# Patient Record
Sex: FEMALE | Race: WHITE | NOT HISPANIC OR LATINO
[De-identification: names, ages, dates, MRNs, and addresses within clinical notes are randomized per-mention and may not be internally consistent; named-entity substitution may affect disease eponyms.]

---

## 2017-08-03 ENCOUNTER — TRANSCRIPTION ENCOUNTER (OUTPATIENT)
Age: 67
End: 2017-08-03

## 2017-12-15 ENCOUNTER — APPOINTMENT (OUTPATIENT)
Dept: MAMMOGRAPHY | Facility: CLINIC | Age: 67
End: 2017-12-15
Payer: COMMERCIAL

## 2017-12-15 ENCOUNTER — OUTPATIENT (OUTPATIENT)
Dept: OUTPATIENT SERVICES | Facility: HOSPITAL | Age: 67
LOS: 1 days | End: 2017-12-15
Payer: COMMERCIAL

## 2017-12-15 DIAGNOSIS — Z00.8 ENCOUNTER FOR OTHER GENERAL EXAMINATION: ICD-10-CM

## 2017-12-15 PROCEDURE — 77067 SCR MAMMO BI INCL CAD: CPT

## 2017-12-15 PROCEDURE — 77063 BREAST TOMOSYNTHESIS BI: CPT | Mod: 26

## 2017-12-15 PROCEDURE — G0202: CPT | Mod: 26

## 2017-12-15 PROCEDURE — 77063 BREAST TOMOSYNTHESIS BI: CPT

## 2018-04-14 ENCOUNTER — OUTPATIENT (OUTPATIENT)
Dept: OUTPATIENT SERVICES | Facility: HOSPITAL | Age: 68
LOS: 1 days | End: 2018-04-14
Payer: COMMERCIAL

## 2018-04-14 ENCOUNTER — APPOINTMENT (OUTPATIENT)
Dept: ULTRASOUND IMAGING | Facility: CLINIC | Age: 68
End: 2018-04-14
Payer: COMMERCIAL

## 2018-04-14 DIAGNOSIS — Z00.8 ENCOUNTER FOR OTHER GENERAL EXAMINATION: ICD-10-CM

## 2018-04-14 PROCEDURE — 76536 US EXAM OF HEAD AND NECK: CPT

## 2018-04-14 PROCEDURE — 76536 US EXAM OF HEAD AND NECK: CPT | Mod: 26

## 2019-01-22 ENCOUNTER — APPOINTMENT (OUTPATIENT)
Dept: MAMMOGRAPHY | Facility: CLINIC | Age: 69
End: 2019-01-22
Payer: COMMERCIAL

## 2019-01-22 ENCOUNTER — OUTPATIENT (OUTPATIENT)
Dept: OUTPATIENT SERVICES | Facility: HOSPITAL | Age: 69
LOS: 1 days | End: 2019-01-22
Payer: COMMERCIAL

## 2019-01-22 ENCOUNTER — APPOINTMENT (OUTPATIENT)
Dept: RADIOLOGY | Facility: CLINIC | Age: 69
End: 2019-01-22
Payer: COMMERCIAL

## 2019-01-22 DIAGNOSIS — Z00.8 ENCOUNTER FOR OTHER GENERAL EXAMINATION: ICD-10-CM

## 2019-01-22 PROCEDURE — 77067 SCR MAMMO BI INCL CAD: CPT | Mod: 26

## 2019-01-22 PROCEDURE — 77067 SCR MAMMO BI INCL CAD: CPT

## 2019-01-22 PROCEDURE — 77063 BREAST TOMOSYNTHESIS BI: CPT

## 2019-01-22 PROCEDURE — 77063 BREAST TOMOSYNTHESIS BI: CPT | Mod: 26

## 2019-01-22 PROCEDURE — 77080 DXA BONE DENSITY AXIAL: CPT

## 2019-01-22 PROCEDURE — 77080 DXA BONE DENSITY AXIAL: CPT | Mod: 26

## 2019-05-16 ENCOUNTER — TRANSCRIPTION ENCOUNTER (OUTPATIENT)
Age: 69
End: 2019-05-16

## 2019-05-31 ENCOUNTER — TRANSCRIPTION ENCOUNTER (OUTPATIENT)
Age: 69
End: 2019-05-31

## 2019-10-29 ENCOUNTER — APPOINTMENT (OUTPATIENT)
Dept: ULTRASOUND IMAGING | Facility: CLINIC | Age: 69
End: 2019-10-29
Payer: COMMERCIAL

## 2019-10-29 ENCOUNTER — OUTPATIENT (OUTPATIENT)
Dept: OUTPATIENT SERVICES | Facility: HOSPITAL | Age: 69
LOS: 1 days | End: 2019-10-29
Payer: COMMERCIAL

## 2019-10-29 DIAGNOSIS — E04.1 NONTOXIC SINGLE THYROID NODULE: ICD-10-CM

## 2019-10-29 PROCEDURE — 76536 US EXAM OF HEAD AND NECK: CPT

## 2019-10-29 PROCEDURE — 76536 US EXAM OF HEAD AND NECK: CPT | Mod: 26

## 2020-02-21 ENCOUNTER — APPOINTMENT (OUTPATIENT)
Dept: MAMMOGRAPHY | Facility: CLINIC | Age: 70
End: 2020-02-21
Payer: COMMERCIAL

## 2020-02-21 ENCOUNTER — OUTPATIENT (OUTPATIENT)
Dept: OUTPATIENT SERVICES | Facility: HOSPITAL | Age: 70
LOS: 1 days | End: 2020-02-21
Payer: COMMERCIAL

## 2020-02-21 DIAGNOSIS — Z00.8 ENCOUNTER FOR OTHER GENERAL EXAMINATION: ICD-10-CM

## 2020-02-21 PROCEDURE — 77063 BREAST TOMOSYNTHESIS BI: CPT

## 2020-02-21 PROCEDURE — 77063 BREAST TOMOSYNTHESIS BI: CPT | Mod: 26

## 2020-02-21 PROCEDURE — 77067 SCR MAMMO BI INCL CAD: CPT | Mod: 26

## 2020-02-21 PROCEDURE — 77067 SCR MAMMO BI INCL CAD: CPT

## 2020-12-30 ENCOUNTER — TRANSCRIPTION ENCOUNTER (OUTPATIENT)
Age: 70
End: 2020-12-30

## 2021-02-24 ENCOUNTER — APPOINTMENT (OUTPATIENT)
Dept: COLORECTAL SURGERY | Facility: CLINIC | Age: 71
End: 2021-02-24
Payer: COMMERCIAL

## 2021-02-24 VITALS
HEIGHT: 64 IN | WEIGHT: 158 LBS | SYSTOLIC BLOOD PRESSURE: 150 MMHG | DIASTOLIC BLOOD PRESSURE: 80 MMHG | BODY MASS INDEX: 26.98 KG/M2 | RESPIRATION RATE: 12 BRPM | HEART RATE: 62 BPM

## 2021-02-24 DIAGNOSIS — K21.9 GASTRO-ESOPHAGEAL REFLUX DISEASE W/OUT ESOPHAGITIS: ICD-10-CM

## 2021-02-24 DIAGNOSIS — Z82.49 FAMILY HISTORY OF ISCHEMIC HEART DISEASE AND OTHER DISEASES OF THE CIRCULATORY SYSTEM: ICD-10-CM

## 2021-02-24 DIAGNOSIS — Z87.19 PERSONAL HISTORY OF OTHER DISEASES OF THE DIGESTIVE SYSTEM: ICD-10-CM

## 2021-02-24 PROCEDURE — 45300 PROCTOSIGMOIDOSCOPY DX: CPT

## 2021-02-24 PROCEDURE — 99203 OFFICE O/P NEW LOW 30 MIN: CPT

## 2021-02-24 PROCEDURE — 99072 ADDL SUPL MATRL&STAF TM PHE: CPT

## 2021-02-24 PROCEDURE — 99214 OFFICE O/P EST MOD 30 MIN: CPT | Mod: 25

## 2021-02-24 NOTE — REVIEW OF SYSTEMS
[As Noted in HPI] : as noted in HPI [Negative] : Endocrine [Chest Pain] : no chest pain [Shortness Of Breath] : no shortness of breath [Joint Pain] : no joint pain [Easy Bleeding] : no tendency for easy bleeding [Easy Bruising] : no tendency for easy bruising

## 2021-02-24 NOTE — ASSESSMENT
[FreeTextEntry1] : Pleasant 70-year-old female who presents today for consultation regarding a recent incomplete colonoscopy.\par \par In 2016 the patient went for a screening colonoscopy which could not be completed due to tortuosity and anatomic variation. A virtual colonoscopy was negative. Recently another attempt was made at a colonoscopy which again could not be completed due to anatomic constraints. Scope apparently could not be passed proximal to 40 cm. The area of abnormality was in the rectosigmoid. Supposedly a rectal polyp was seen. Patient was sent for another virtual colonoscopy which showed the possibility of a 10 x 6 mm distal transverse colon polyp. Patient has no problems moving her bowels and denies rectal bleeding.\par \par She has had no prior abdominal surgery. She does have hypercholesterolemia. She has had surgery on her right hand in 2005.\par \par Physical examination of the abdomen reveals a soft, nontender, nondistended abdomen. Inspection of the anorectal area is unremarkable. Digital exam reveals no palpable mass. Rigid sigmoidoscopy was performed to the 14 cm level. No abnormalities or polypoid lesions were noted.\par \par Patient is relatively young and does not have significant medical comorbidities. She has had 2 attempted optical colonoscopies years apart which were unable to completed.  A recent virtual colonoscopy reveals a possible 1 cm distal transverse colon polyp. I believe this anatomic abnormality should be removed to allow the patient to have actual optical colonoscopies for follow-up.\par \par We discussed all aspects of a laparoscopic-assisted procedure including anticipated operative time, hospital stay and time to complete recuperation. Risks, alternatives and benefits including but not limited to an anastomotic leak, wound infection and deep venous thrombosis were discussed.\par \par Questions were answered and she will consider my recommendations.

## 2021-02-24 NOTE — HISTORY OF PRESENT ILLNESS
[FreeTextEntry1] : 69yo WF with hx of incomplete colonoscopy in 2016. Virtual colonoscopy performed. Results reportedly wnl.\par \par Pt with recent attempt at optical colonoscopy.  Rectal polyp/diverticulosis noted. Scope could not traverse fixed area @40cm. F/U virtual colonoscopy performed.\par \par Pt denies abdominal pain, bloating. +daily formed stool. Denies BRBPR. Denies diverticulitis in past.

## 2021-02-24 NOTE — PHYSICAL EXAM
[Normal Breath Sounds] : Normal breath sounds [Normal Heart Sounds] : normal heart sounds [Normal Rate and Rhythm] : normal rate and rhythm [No Edema] : No edema [Alert] : alert [Oriented to Person] : oriented to person [Oriented to Place] : oriented to place [Oriented to Time] : oriented to time [Calm] : calm [de-identified] : round soft +BS NT/ND [de-identified] : NC/AT [de-identified] : +ROM [de-identified] : intact

## 2021-03-04 ENCOUNTER — APPOINTMENT (OUTPATIENT)
Dept: MAMMOGRAPHY | Facility: CLINIC | Age: 71
End: 2021-03-04

## 2021-03-04 ENCOUNTER — APPOINTMENT (OUTPATIENT)
Dept: ULTRASOUND IMAGING | Facility: CLINIC | Age: 71
End: 2021-03-04

## 2021-03-11 ENCOUNTER — APPOINTMENT (OUTPATIENT)
Dept: MAMMOGRAPHY | Facility: CLINIC | Age: 71
End: 2021-03-11
Payer: COMMERCIAL

## 2021-03-11 ENCOUNTER — OUTPATIENT (OUTPATIENT)
Dept: OUTPATIENT SERVICES | Facility: HOSPITAL | Age: 71
LOS: 1 days | End: 2021-03-11
Payer: COMMERCIAL

## 2021-03-11 DIAGNOSIS — Z00.8 ENCOUNTER FOR OTHER GENERAL EXAMINATION: ICD-10-CM

## 2021-03-11 PROCEDURE — 77067 SCR MAMMO BI INCL CAD: CPT

## 2021-03-11 PROCEDURE — 77067 SCR MAMMO BI INCL CAD: CPT | Mod: 26

## 2021-03-11 PROCEDURE — 77063 BREAST TOMOSYNTHESIS BI: CPT

## 2021-03-11 PROCEDURE — 77063 BREAST TOMOSYNTHESIS BI: CPT | Mod: 26

## 2021-04-08 ENCOUNTER — OUTPATIENT (OUTPATIENT)
Dept: OUTPATIENT SERVICES | Facility: HOSPITAL | Age: 71
LOS: 1 days | End: 2021-04-08
Payer: COMMERCIAL

## 2021-04-08 VITALS
HEART RATE: 86 BPM | OXYGEN SATURATION: 96 % | RESPIRATION RATE: 16 BRPM | HEIGHT: 64 IN | SYSTOLIC BLOOD PRESSURE: 116 MMHG | TEMPERATURE: 97 F | DIASTOLIC BLOOD PRESSURE: 68 MMHG | WEIGHT: 154.98 LBS

## 2021-04-08 DIAGNOSIS — R94.30 ABNORMAL RESULT OF CARDIOVASCULAR FUNCTION STUDY, UNSPECIFIED: ICD-10-CM

## 2021-04-08 DIAGNOSIS — Z98.890 OTHER SPECIFIED POSTPROCEDURAL STATES: Chronic | ICD-10-CM

## 2021-04-08 DIAGNOSIS — Z98.49 CATARACT EXTRACTION STATUS, UNSPECIFIED EYE: Chronic | ICD-10-CM

## 2021-04-08 DIAGNOSIS — K56.699 OTHER INTESTINAL OBSTRUCTION UNSPECIFIED AS TO PARTIAL VERSUS COMPLETE OBSTRUCTION: ICD-10-CM

## 2021-04-08 DIAGNOSIS — Z29.9 ENCOUNTER FOR PROPHYLACTIC MEASURES, UNSPECIFIED: ICD-10-CM

## 2021-04-08 DIAGNOSIS — Z01.818 ENCOUNTER FOR OTHER PREPROCEDURAL EXAMINATION: ICD-10-CM

## 2021-04-08 DIAGNOSIS — K63.5 POLYP OF COLON: ICD-10-CM

## 2021-04-08 LAB
ANION GAP SERPL CALC-SCNC: 14 MMOL/L — SIGNIFICANT CHANGE UP (ref 5–17)
BLD GP AB SCN SERPL QL: NEGATIVE — SIGNIFICANT CHANGE UP
BUN SERPL-MCNC: 17 MG/DL — SIGNIFICANT CHANGE UP (ref 7–23)
CALCIUM SERPL-MCNC: 9.6 MG/DL — SIGNIFICANT CHANGE UP (ref 8.4–10.5)
CHLORIDE SERPL-SCNC: 102 MMOL/L — SIGNIFICANT CHANGE UP (ref 96–108)
CO2 SERPL-SCNC: 23 MMOL/L — SIGNIFICANT CHANGE UP (ref 22–31)
CREAT SERPL-MCNC: 0.75 MG/DL — SIGNIFICANT CHANGE UP (ref 0.5–1.3)
GLUCOSE SERPL-MCNC: 91 MG/DL — SIGNIFICANT CHANGE UP (ref 70–99)
HCT VFR BLD CALC: 42.5 % — SIGNIFICANT CHANGE UP (ref 34.5–45)
HGB BLD-MCNC: 13.3 G/DL — SIGNIFICANT CHANGE UP (ref 11.5–15.5)
MCHC RBC-ENTMCNC: 28.5 PG — SIGNIFICANT CHANGE UP (ref 27–34)
MCHC RBC-ENTMCNC: 31.3 GM/DL — LOW (ref 32–36)
MCV RBC AUTO: 91 FL — SIGNIFICANT CHANGE UP (ref 80–100)
NRBC # BLD: 0 /100 WBCS — SIGNIFICANT CHANGE UP (ref 0–0)
PLATELET # BLD AUTO: 379 K/UL — SIGNIFICANT CHANGE UP (ref 150–400)
POTASSIUM SERPL-MCNC: 5 MMOL/L — SIGNIFICANT CHANGE UP (ref 3.5–5.3)
POTASSIUM SERPL-SCNC: 5 MMOL/L — SIGNIFICANT CHANGE UP (ref 3.5–5.3)
RBC # BLD: 4.67 M/UL — SIGNIFICANT CHANGE UP (ref 3.8–5.2)
RBC # FLD: 12.5 % — SIGNIFICANT CHANGE UP (ref 10.3–14.5)
RH IG SCN BLD-IMP: POSITIVE — SIGNIFICANT CHANGE UP
SODIUM SERPL-SCNC: 139 MMOL/L — SIGNIFICANT CHANGE UP (ref 135–145)
WBC # BLD: 6.57 K/UL — SIGNIFICANT CHANGE UP (ref 3.8–10.5)
WBC # FLD AUTO: 6.57 K/UL — SIGNIFICANT CHANGE UP (ref 3.8–10.5)

## 2021-04-08 PROCEDURE — G0463: CPT

## 2021-04-08 PROCEDURE — 86900 BLOOD TYPING SEROLOGIC ABO: CPT

## 2021-04-08 PROCEDURE — 83036 HEMOGLOBIN GLYCOSYLATED A1C: CPT

## 2021-04-08 PROCEDURE — 86850 RBC ANTIBODY SCREEN: CPT

## 2021-04-08 PROCEDURE — 85027 COMPLETE CBC AUTOMATED: CPT

## 2021-04-08 PROCEDURE — 86901 BLOOD TYPING SEROLOGIC RH(D): CPT

## 2021-04-08 PROCEDURE — 80048 BASIC METABOLIC PNL TOTAL CA: CPT

## 2021-04-08 RX ORDER — LIDOCAINE HCL 20 MG/ML
0.2 VIAL (ML) INJECTION ONCE
Refills: 0 | Status: DISCONTINUED | OUTPATIENT
Start: 2021-04-20 | End: 2021-04-20

## 2021-04-08 RX ORDER — CHLORHEXIDINE GLUCONATE 213 G/1000ML
1 SOLUTION TOPICAL ONCE
Refills: 0 | Status: DISCONTINUED | OUTPATIENT
Start: 2021-04-20 | End: 2021-04-20

## 2021-04-08 RX ORDER — SODIUM CHLORIDE 9 MG/ML
3 INJECTION INTRAMUSCULAR; INTRAVENOUS; SUBCUTANEOUS EVERY 8 HOURS
Refills: 0 | Status: DISCONTINUED | OUTPATIENT
Start: 2021-04-20 | End: 2021-04-20

## 2021-04-08 RX ORDER — CEFOTETAN DISODIUM 1 G
2 VIAL (EA) INJECTION ONCE
Refills: 0 | Status: DISCONTINUED | OUTPATIENT
Start: 2021-04-20 | End: 2021-04-20

## 2021-04-08 NOTE — H&P PST ADULT - ASSESSMENT
SHEREENI VTE 2.0 SCORE [CLOT updated 2019]    AGE RELATED RISK FACTORS                                                       MOBILITY RELATED FACTORS  [ ] Age 41-60 years                                            (1 Point)                    [ ] Bed rest                                                        (1 Point)  [x] Age: 61-74 years                                           (2 Points)                  [ ] Plaster cast                                                   (2 Points)  [ ] Age= 75 years                                              (3 Points)                    [ ] Bed bound for more than 72 hours                 (2 Points)    DISEASE RELATED RISK FACTORS                                               GENDER SPECIFIC FACTORS  [ ] Edema in the lower extremities                       (1 Point)              [ ] Pregnancy                                                     (1 Point)  [ ] Varicose veins                                               (1 Point)                     [ ] Post-partum < 6 weeks                                   (1 Point)             [x] BMI > 25 Kg/m2                                            (1 Point)                     [ ] Hormonal therapy  or oral contraception          (1 Point)                 [ ] Sepsis (in the previous month)                        (1 Point)               [ ] History of pregnancy complications                 (1 point)  [ ] Pneumonia or serious lung disease                                               [ ] Unexplained or recurrent                     (1 Point)           (in the previous month)                               (1 Point)  [ ] Abnormal pulmonary function test                     (1 Point)                 SURGERY RELATED RISK FACTORS  [ ] Acute myocardial infarction                              (1 Point)               [ ]  Section                                             (1 Point)  [ ] Congestive heart failure (in the previous month)  (1 Point)      [ ] Minor surgery                                                  (1 Point)   [ ] Inflammatory bowel disease                             (1 Point)               [ ] Arthroscopic surgery                                        (2 Points)  [ ] Central venous access                                      (2 Points)                [x] General surgery lasting more than 45 minutes (2 points)  [ ] Malignancy- Present or previous                   (2 Points)                [ ] Elective arthroplasty                                         (5 points)    [ ] Stroke (in the previous month)                          (5 Points)                                                                                                                                                           HEMATOLOGY RELATED FACTORS                                                 TRAUMA RELATED RISK FACTORS  [ ] Prior episodes of VTE                                     (3 Points)                [ ] Fracture of the hip, pelvis, or leg                       (5 Points)  [ ] Positive family history for VTE                         (3 Points)             [ ] Acute spinal cord injury (in the previous month)  (5 Points)  [ ] Prothrombin 62430 A                                     (3 Points)               [ ] Paralysis  (less than 1 month)                             (5 Points)  [ ] Factor V Leiden                                             (3 Points)                  [ ] Multiple Trauma within 1 month                        (5 Points)  [ ] Lupus anticoagulants                                     (3 Points)                                                           [ ] Anticardiolipin antibodies                               (3 Points)                                                       [ ] High homocysteine in the blood                      (3 Points)                                             [ ] Other congenital or acquired thrombophilia      (3 Points)                                                [ ] Heparin induced thrombocytopenia                  (3 Points)                                     Total Score [    5     ]

## 2021-04-08 NOTE — H&P PST ADULT - OTHER CARE PROVIDERS
Dr. Rahel Kelly [phone] (cardio) - last note, results on chart Dr. Rahel Kelly [phone] (cardio) - last note, results from 2019 requested

## 2021-04-08 NOTE — H&P PST ADULT - NSICDXPASTSURGICALHX_GEN_ALL_CORE_FT
PAST SURGICAL HISTORY:  S/P cataract extraction b/l, 2010    S/P ORIF (open reduction internal fixation) fracture of 5th metacarpal, 2006     PAST SURGICAL HISTORY:  S/P cataract extraction b/l, 2010    S/P ORIF (open reduction internal fixation) fracture of Right 5th metacarpal, 2006

## 2021-04-08 NOTE — H&P PST ADULT - NSANTHOSAYNRD_GEN_A_CORE
No. JUAN ALBERTO screening performed.  STOP BANG Legend: 0-2 = LOW Risk; 3-4 = INTERMEDIATE Risk; 5-8 = HIGH Risk

## 2021-04-08 NOTE — H&P PST ADULT - HISTORY OF PRESENT ILLNESS
Patient is a 70 y.o. female with h/o anxiety, HLD, who recently underwent a check-up colonoscopy and was found to have rectal polyp/diverticulosis with inability to pass the scope proximal to 40cm (rectosigmoid). Patient denies abdominal pain, n/v, diarrhea, constipation, change in bowel habits. She is scheduled for ERAS Laparoscopic Anterior Resection.

## 2021-04-08 NOTE — H&P PST ADULT - NSICDXPROBLEM_GEN_ALL_CORE_FT
PROBLEM DIAGNOSES  Problem: Abnormal cardiac function test  Assessment and Plan: - patient states she had a check-up work-up, failed exercise stress test, but passed nuclear stress test (Dr. KIRK Kelly, [facility] [phone]) - will get results faxed       PROBLEM DIAGNOSES  Problem: Colon polyp  Assessment and Plan:  ERAS Laparoscopic Anterior Resection.       Problem: Abnormal cardiac function test  Assessment and Plan: - patient states she had a check-up work-up, failed exercise stress test, but passed nuclear stress test (Dr. KIRK Kelly, [facility] [phone]) - will get results faxed       PROBLEM DIAGNOSES  Problem: Colon polyp  Assessment and Plan:  ERAS Laparoscopic Anterior Resection.       Problem: Abnormal cardiac function test  Assessment and Plan: - patient states she had a check-up work-up, failed exercise stress test, but passed nuclear stress test (Dr. KIRK Kelly, [facility] [phone]) - will get results faxed    Problem: Need for prophylactic measure  Assessment and Plan: The Caprini score indicates this patient is at risk for a VTE event (score 3-5).  Most surgical patients in this group would benefit from pharmacologic prophylaxis.  The surgical team will determine the balance between VTE risk and bleeding risk

## 2021-04-08 NOTE — H&P PST ADULT - NSICDXPASTMEDICALHX_GEN_ALL_CORE_FT
PAST MEDICAL HISTORY:  Anxiety     Female bladder prolapse     Hiatal hernia with GERD     HLD (hyperlipidemia)      PAST MEDICAL HISTORY:  Anxiety     Colon polyp     Cystocele with rectocele     Hiatal hernia with GERD     HLD (hyperlipidemia)

## 2021-04-09 LAB
A1C WITH ESTIMATED AVERAGE GLUCOSE RESULT: 5.4 % — SIGNIFICANT CHANGE UP (ref 4–5.6)
ESTIMATED AVERAGE GLUCOSE: 108 MG/DL — SIGNIFICANT CHANGE UP (ref 68–114)

## 2021-04-13 PROBLEM — F41.9 ANXIETY DISORDER, UNSPECIFIED: Chronic | Status: ACTIVE | Noted: 2021-04-08

## 2021-04-13 PROBLEM — K21.9 GASTRO-ESOPHAGEAL REFLUX DISEASE WITHOUT ESOPHAGITIS: Chronic | Status: ACTIVE | Noted: 2021-04-08

## 2021-04-13 PROBLEM — K63.5 POLYP OF COLON: Chronic | Status: ACTIVE | Noted: 2021-04-08

## 2021-04-13 PROBLEM — E78.5 HYPERLIPIDEMIA, UNSPECIFIED: Chronic | Status: ACTIVE | Noted: 2021-04-08

## 2021-04-13 PROBLEM — N81.10 CYSTOCELE, UNSPECIFIED: Chronic | Status: ACTIVE | Noted: 2021-04-08

## 2021-04-17 ENCOUNTER — OUTPATIENT (OUTPATIENT)
Dept: OUTPATIENT SERVICES | Facility: HOSPITAL | Age: 71
LOS: 1 days | End: 2021-04-17
Payer: COMMERCIAL

## 2021-04-17 DIAGNOSIS — Z98.890 OTHER SPECIFIED POSTPROCEDURAL STATES: Chronic | ICD-10-CM

## 2021-04-17 DIAGNOSIS — Z98.49 CATARACT EXTRACTION STATUS, UNSPECIFIED EYE: Chronic | ICD-10-CM

## 2021-04-17 DIAGNOSIS — Z11.52 ENCOUNTER FOR SCREENING FOR COVID-19: ICD-10-CM

## 2021-04-17 LAB — SARS-COV-2 RNA SPEC QL NAA+PROBE: SIGNIFICANT CHANGE UP

## 2021-04-17 PROCEDURE — U0003: CPT

## 2021-04-17 PROCEDURE — C9803: CPT

## 2021-04-17 PROCEDURE — U0005: CPT

## 2021-04-19 ENCOUNTER — TRANSCRIPTION ENCOUNTER (OUTPATIENT)
Age: 71
End: 2021-04-19

## 2021-04-20 ENCOUNTER — APPOINTMENT (OUTPATIENT)
Dept: COLORECTAL SURGERY | Facility: HOSPITAL | Age: 71
End: 2021-04-20
Payer: COMMERCIAL

## 2021-04-20 ENCOUNTER — RESULT REVIEW (OUTPATIENT)
Age: 71
End: 2021-04-20

## 2021-04-20 ENCOUNTER — INPATIENT (INPATIENT)
Facility: HOSPITAL | Age: 71
LOS: 1 days | Discharge: HOME | DRG: 331 | End: 2021-04-22
Attending: SURGERY | Admitting: SURGERY
Payer: COMMERCIAL

## 2021-04-20 VITALS
WEIGHT: 154.98 LBS | SYSTOLIC BLOOD PRESSURE: 117 MMHG | DIASTOLIC BLOOD PRESSURE: 75 MMHG | RESPIRATION RATE: 18 BRPM | TEMPERATURE: 98 F | HEIGHT: 64 IN | HEART RATE: 94 BPM | OXYGEN SATURATION: 96 %

## 2021-04-20 DIAGNOSIS — K56.699 OTHER INTESTINAL OBSTRUCTION UNSPECIFIED AS TO PARTIAL VERSUS COMPLETE OBSTRUCTION: ICD-10-CM

## 2021-04-20 DIAGNOSIS — Z01.818 ENCOUNTER FOR OTHER PREPROCEDURAL EXAMINATION: ICD-10-CM

## 2021-04-20 DIAGNOSIS — Z98.49 CATARACT EXTRACTION STATUS, UNSPECIFIED EYE: Chronic | ICD-10-CM

## 2021-04-20 DIAGNOSIS — Z98.890 OTHER SPECIFIED POSTPROCEDURAL STATES: Chronic | ICD-10-CM

## 2021-04-20 LAB — GLUCOSE BLDC GLUCOMTR-MCNC: 110 MG/DL — HIGH (ref 70–99)

## 2021-04-20 PROCEDURE — 88307 TISSUE EXAM BY PATHOLOGIST: CPT | Mod: 26

## 2021-04-20 PROCEDURE — 44145 PARTIAL REMOVAL OF COLON: CPT

## 2021-04-20 PROCEDURE — 52005 CYSTO W/URTRL CATHJ: CPT

## 2021-04-20 PROCEDURE — 45300 PROCTOSIGMOIDOSCOPY DX: CPT | Mod: 59

## 2021-04-20 RX ORDER — ATORVASTATIN CALCIUM 80 MG/1
20 TABLET, FILM COATED ORAL AT BEDTIME
Refills: 0 | Status: DISCONTINUED | OUTPATIENT
Start: 2021-04-20 | End: 2021-04-22

## 2021-04-20 RX ORDER — OXYCODONE HYDROCHLORIDE 5 MG/1
5 TABLET ORAL EVERY 4 HOURS
Refills: 0 | Status: DISCONTINUED | OUTPATIENT
Start: 2021-04-20 | End: 2021-04-22

## 2021-04-20 RX ORDER — OXYCODONE HYDROCHLORIDE 5 MG/1
10 TABLET ORAL
Refills: 0 | Status: DISCONTINUED | OUTPATIENT
Start: 2021-04-20 | End: 2021-04-21

## 2021-04-20 RX ORDER — PANTOPRAZOLE SODIUM 20 MG/1
40 TABLET, DELAYED RELEASE ORAL
Refills: 0 | Status: DISCONTINUED | OUTPATIENT
Start: 2021-04-20 | End: 2021-04-22

## 2021-04-20 RX ORDER — OXYCODONE HYDROCHLORIDE 5 MG/1
2.5 TABLET ORAL EVERY 4 HOURS
Refills: 0 | Status: DISCONTINUED | OUTPATIENT
Start: 2021-04-20 | End: 2021-04-22

## 2021-04-20 RX ORDER — NALOXONE HYDROCHLORIDE 4 MG/.1ML
0.1 SPRAY NASAL
Refills: 0 | Status: DISCONTINUED | OUTPATIENT
Start: 2021-04-20 | End: 2021-04-21

## 2021-04-20 RX ORDER — OXYCODONE HYDROCHLORIDE 5 MG/1
5 TABLET ORAL
Refills: 0 | Status: DISCONTINUED | OUTPATIENT
Start: 2021-04-20 | End: 2021-04-21

## 2021-04-20 RX ORDER — GABAPENTIN 400 MG/1
600 CAPSULE ORAL ONCE
Refills: 0 | Status: COMPLETED | OUTPATIENT
Start: 2021-04-20 | End: 2021-04-20

## 2021-04-20 RX ORDER — ACETAMINOPHEN 500 MG
1000 TABLET ORAL EVERY 6 HOURS
Refills: 0 | Status: COMPLETED | OUTPATIENT
Start: 2021-04-20 | End: 2021-04-21

## 2021-04-20 RX ORDER — SERTRALINE 25 MG/1
25 TABLET, FILM COATED ORAL DAILY
Refills: 0 | Status: DISCONTINUED | OUTPATIENT
Start: 2021-04-20 | End: 2021-04-22

## 2021-04-20 RX ORDER — ENOXAPARIN SODIUM 100 MG/ML
40 INJECTION SUBCUTANEOUS DAILY
Refills: 0 | Status: DISCONTINUED | OUTPATIENT
Start: 2021-04-20 | End: 2021-04-22

## 2021-04-20 RX ORDER — CELECOXIB 200 MG/1
400 CAPSULE ORAL ONCE
Refills: 0 | Status: COMPLETED | OUTPATIENT
Start: 2021-04-20 | End: 2021-04-20

## 2021-04-20 RX ORDER — SODIUM CHLORIDE 9 MG/ML
1000 INJECTION, SOLUTION INTRAVENOUS
Refills: 0 | Status: DISCONTINUED | OUTPATIENT
Start: 2021-04-20 | End: 2021-04-21

## 2021-04-20 RX ORDER — MORPHINE SULFATE 50 MG/1
0.15 CAPSULE, EXTENDED RELEASE ORAL ONCE
Refills: 0 | Status: DISCONTINUED | OUTPATIENT
Start: 2021-04-20 | End: 2021-04-21

## 2021-04-20 RX ORDER — ONDANSETRON 8 MG/1
4 TABLET, FILM COATED ORAL EVERY 6 HOURS
Refills: 0 | Status: DISCONTINUED | OUTPATIENT
Start: 2021-04-20 | End: 2021-04-21

## 2021-04-20 RX ORDER — KETOROLAC TROMETHAMINE 30 MG/ML
15 SYRINGE (ML) INJECTION EVERY 6 HOURS
Refills: 0 | Status: DISCONTINUED | OUTPATIENT
Start: 2021-04-20 | End: 2021-04-21

## 2021-04-20 RX ADMIN — CELECOXIB 400 MILLIGRAM(S): 200 CAPSULE ORAL at 13:02

## 2021-04-20 RX ADMIN — ONDANSETRON 4 MILLIGRAM(S): 8 TABLET, FILM COATED ORAL at 21:17

## 2021-04-20 RX ADMIN — SODIUM CHLORIDE 40 MILLILITER(S): 9 INJECTION, SOLUTION INTRAVENOUS at 20:00

## 2021-04-20 RX ADMIN — GABAPENTIN 600 MILLIGRAM(S): 400 CAPSULE ORAL at 13:03

## 2021-04-20 NOTE — PRE-ANESTHESIA EVALUATION ADULT - LAST STRESS TEST
2019 - patient states she had a check-up work-up, failed exercise stress test, but passed nuclear stress test (Dr. KIRK Kelly, [facility] [phone]) - will get results faxed

## 2021-04-20 NOTE — PRE-ANESTHESIA EVALUATION ADULT - NSANTHADDINFOFT_GEN_ALL_CORE
Nuclear Stress results from 11/19 discussed with patient's cardiologist (Dr. Eric Kelly) who claims due to (-) nuclear study and excellent LV function, patient is an acceptable risk for surgical procedure. Will proceed as planned.

## 2021-04-20 NOTE — CHART NOTE - NSCHARTNOTEFT_GEN_A_CORE
Post Operative Note  Patient: CHINA WU 70y (1950) Female   MRN: [id number]  Location: [facility] PACU 28  Visit: 04-20-21 Inpatient  Date: 04-20-21 @ 22:29    Procedure: S/P Open LAR     Subjective:   Seen and examined in the PACU. No acute events in the immediate postop period. Vitals stable with soft BP in the mid 90s. UOP adequate. Mildly nauseous with somewhat relief from IV Zofran. Did not get up to a chair yet due to nausea.     Objective:  Vitals: T(F): 98.1 (04-20-21 @ 21:00), Max: 98.4 (04-20-21 @ 12:36)  HR: 92 (04-20-21 @ 22:00)  BP: 107/59 (04-20-21 @ 22:00) (88/52 - 125/60)  RR: 17 (04-20-21 @ 22:00)  SpO2: 95% (04-20-21 @ 22:00)  Vent Settings:     In:   04-20-21 @ 07:01  -  04-20-21 @ 22:29  --------------------------------------------------------  IN: 120 mL      IV Fluids: lactated ringers. 1000 milliLiter(s) (40 mL/Hr) IV Continuous <Continuous>      Out:   04-20-21 @ 07:01  -  04-20-21 @ 22:29  --------------------------------------------------------  OUT: 210 mL      EBL:     Voided Urine:   04-20-21 @ 07:01  -  04-20-21 @ 22:29  --------------------------------------------------------  OUT: 210 mL      Wagner Catheter: yes no   Drains:   KASSIDY:    ,   Chest Tube:      NG Tube:         Physical Examination:  General: NAD, resting comfortably in bed  HEENT: Normocephalic atraumatic  Respiratory: Nonlabored respirations, normal CW expansion.  Cardio: S1S2, regular rate and rhythm.  Abdomen: softly distended, appropriately tender, surgical incisions are c/d/i. NGT/OGT*  Vascular: extremities are warm and well perfused.     Imaging:  No post-op imaging studies    Assessment:  70yFemale patient S/P open LAR.    Plan:  ERP protocol   - Pain control: IV tylenol, IV Toradol, Duramorph spinal, oxycodone prn    - Diet: CLD  - LR @ 40cc/hr   - Wagner  - Activity: as tolerated   - DVT ppx: LVX   - FU AM labs, replete prn     Date/Time: 04-20-21 @ 22:29 Post Operative Note  Patient: CHINA WU 70y (1950) Female   MRN: [id number]  Location: [facility] PACU 28  Visit: 04-20-21 Inpatient  Date: 04-20-21 @ 22:29    Procedure: S/P Open LAR     Subjective:   Seen and examined in the PACU. No acute events in the immediate postop period. Vitals stable with soft BP in the mid 90s. UOP adequate. Mildly nauseous with somewhat relief from IV Zofran. Did not get up to a chair yet due to nausea.     Objective:  Vitals: T(F): 98.1 (04-20-21 @ 21:00), Max: 98.4 (04-20-21 @ 12:36)  HR: 92 (04-20-21 @ 22:00)  BP: 107/59 (04-20-21 @ 22:00) (88/52 - 125/60)  RR: 17 (04-20-21 @ 22:00)  SpO2: 95% (04-20-21 @ 22:00)  Vent Settings:     In:   04-20-21 @ 07:01  -  04-20-21 @ 22:29  --------------------------------------------------------  IN: 120 mL      IV Fluids: lactated ringers. 1000 milliLiter(s) (40 mL/Hr) IV Continuous <Continuous>      Out:   04-20-21 @ 07:01  -  04-20-21 @ 22:29  --------------------------------------------------------  OUT: 210 mL      EBL: 75cc    Voided Urine:   04-20-21 @ 07:01  -  04-20-21 @ 22:29  --------------------------------------------------------  OUT: 210 mL      Wagner Catheter: yes     Physical Examination:  General: NAD, resting comfortably in bed  HEENT: Normocephalic atraumatic  Respiratory: Nonlabored respirations, normal CW expansion.  Cardio: regular rate and rhythm.  Abdomen: softly distended, appropriately tender, surgical incision is c/d/i, dry gauze with tega derm.   : Wagner with clear, yellow urine  Vascular: extremities are warm and well perfused.     Imaging:  No post-op imaging studies    Assessment:  70yFemale patient S/P open LAR.    Plan:  ERP protocol   - Pain control: IV tylenol, IV Toradol, Duramorph spinal, oxycodone prn    - Diet: CLD  - LR @ 40cc/hr   - Wagner  - Activity: as tolerated   - DVT ppx: LVX   - FU AM labs, replete prn     Date/Time: 04-20-21 @ 22:29    [facility]  [id number]

## 2021-04-20 NOTE — BRIEF OPERATIVE NOTE - OPERATION/FINDINGS
Open low anterior resection with EEA stapler for anastomosis at 10cm.  The sigmoid colon had several adhesions with scarring causing an acute angle in the colon which was resected.

## 2021-04-21 ENCOUNTER — TRANSCRIPTION ENCOUNTER (OUTPATIENT)
Age: 71
End: 2021-04-21

## 2021-04-21 LAB
ANION GAP SERPL CALC-SCNC: 12 MMOL/L — SIGNIFICANT CHANGE UP (ref 5–17)
BUN SERPL-MCNC: 15 MG/DL — SIGNIFICANT CHANGE UP (ref 7–23)
CALCIUM SERPL-MCNC: 8.2 MG/DL — LOW (ref 8.4–10.5)
CHLORIDE SERPL-SCNC: 105 MMOL/L — SIGNIFICANT CHANGE UP (ref 96–108)
CO2 SERPL-SCNC: 25 MMOL/L — SIGNIFICANT CHANGE UP (ref 22–31)
CREAT SERPL-MCNC: 0.82 MG/DL — SIGNIFICANT CHANGE UP (ref 0.5–1.3)
GLUCOSE SERPL-MCNC: 104 MG/DL — HIGH (ref 70–99)
HCT VFR BLD CALC: 36.4 % — SIGNIFICANT CHANGE UP (ref 34.5–45)
HGB BLD-MCNC: 11.3 G/DL — LOW (ref 11.5–15.5)
MAGNESIUM SERPL-MCNC: 2.3 MG/DL — SIGNIFICANT CHANGE UP (ref 1.6–2.6)
MCHC RBC-ENTMCNC: 28.5 PG — SIGNIFICANT CHANGE UP (ref 27–34)
MCHC RBC-ENTMCNC: 31 GM/DL — LOW (ref 32–36)
MCV RBC AUTO: 91.9 FL — SIGNIFICANT CHANGE UP (ref 80–100)
NRBC # BLD: 0 /100 WBCS — SIGNIFICANT CHANGE UP (ref 0–0)
PHOSPHATE SERPL-MCNC: 4.5 MG/DL — SIGNIFICANT CHANGE UP (ref 2.5–4.5)
PLATELET # BLD AUTO: 336 K/UL — SIGNIFICANT CHANGE UP (ref 150–400)
POTASSIUM SERPL-MCNC: 4.6 MMOL/L — SIGNIFICANT CHANGE UP (ref 3.5–5.3)
POTASSIUM SERPL-SCNC: 4.6 MMOL/L — SIGNIFICANT CHANGE UP (ref 3.5–5.3)
RBC # BLD: 3.96 M/UL — SIGNIFICANT CHANGE UP (ref 3.8–5.2)
RBC # FLD: 13 % — SIGNIFICANT CHANGE UP (ref 10.3–14.5)
SODIUM SERPL-SCNC: 142 MMOL/L — SIGNIFICANT CHANGE UP (ref 135–145)
WBC # BLD: 10.9 K/UL — HIGH (ref 3.8–10.5)
WBC # FLD AUTO: 10.9 K/UL — HIGH (ref 3.8–10.5)

## 2021-04-21 RX ORDER — OXYCODONE HYDROCHLORIDE 5 MG/1
1 TABLET ORAL
Qty: 10 | Refills: 0
Start: 2021-04-21

## 2021-04-21 RX ORDER — IBUPROFEN 200 MG
400 TABLET ORAL EVERY 6 HOURS
Refills: 0 | Status: DISCONTINUED | OUTPATIENT
Start: 2021-04-22 | End: 2021-04-22

## 2021-04-21 RX ORDER — SODIUM CHLORIDE 9 MG/ML
1000 INJECTION, SOLUTION INTRAVENOUS ONCE
Refills: 0 | Status: COMPLETED | OUTPATIENT
Start: 2021-04-21 | End: 2021-04-21

## 2021-04-21 RX ORDER — MAGNESIUM OXIDE 400 MG ORAL TABLET 241.3 MG
1000 TABLET ORAL
Refills: 0 | Status: DISCONTINUED | OUTPATIENT
Start: 2021-04-21 | End: 2021-04-22

## 2021-04-21 RX ORDER — ACETAMINOPHEN 500 MG
1000 TABLET ORAL EVERY 6 HOURS
Refills: 0 | Status: DISCONTINUED | OUTPATIENT
Start: 2021-04-22 | End: 2021-04-22

## 2021-04-21 RX ADMIN — PANTOPRAZOLE SODIUM 40 MILLIGRAM(S): 20 TABLET, DELAYED RELEASE ORAL at 05:23

## 2021-04-21 RX ADMIN — SODIUM CHLORIDE 500 MILLILITER(S): 9 INJECTION, SOLUTION INTRAVENOUS at 22:28

## 2021-04-21 RX ADMIN — Medication 1000 MILLIGRAM(S): at 03:49

## 2021-04-21 RX ADMIN — Medication 15 MILLIGRAM(S): at 05:53

## 2021-04-21 RX ADMIN — Medication 400 MILLIGRAM(S): at 03:19

## 2021-04-21 RX ADMIN — Medication 15 MILLIGRAM(S): at 00:34

## 2021-04-21 RX ADMIN — Medication 1000 MILLIGRAM(S): at 23:06

## 2021-04-21 RX ADMIN — Medication 15 MILLIGRAM(S): at 05:23

## 2021-04-21 RX ADMIN — ATORVASTATIN CALCIUM 20 MILLIGRAM(S): 80 TABLET, FILM COATED ORAL at 22:36

## 2021-04-21 RX ADMIN — SERTRALINE 25 MILLIGRAM(S): 25 TABLET, FILM COATED ORAL at 11:35

## 2021-04-21 RX ADMIN — MAGNESIUM OXIDE 400 MG ORAL TABLET 1000 MILLIGRAM(S): 241.3 TABLET ORAL at 17:46

## 2021-04-21 RX ADMIN — Medication 15 MILLIGRAM(S): at 17:46

## 2021-04-21 RX ADMIN — Medication 15 MILLIGRAM(S): at 00:04

## 2021-04-21 RX ADMIN — Medication 400 MILLIGRAM(S): at 22:36

## 2021-04-21 RX ADMIN — Medication 15 MILLIGRAM(S): at 11:34

## 2021-04-21 RX ADMIN — Medication 400 MILLIGRAM(S): at 16:07

## 2021-04-21 RX ADMIN — ENOXAPARIN SODIUM 40 MILLIGRAM(S): 100 INJECTION SUBCUTANEOUS at 11:34

## 2021-04-21 RX ADMIN — Medication 400 MILLIGRAM(S): at 09:50

## 2021-04-21 NOTE — PROGRESS NOTE ADULT - ASSESSMENT
Assessment:  70yFemale patient POD#1 S/P open LAR on 4/20    Plan:  ERP protocol   - Pain control: IV tylenol, IV Toradol, oxycodone prn    - Diet: CLD, can advance if flatus   - LR @ 40cc/hr   - DC Wagner, FU TOV   - Activity: as tolerated   - DVT ppx: LVX   - FU AM labs, replete prn     [facility]  [id number]  70yFemale patient POD#1 S/P open LAR on 4/20, recovering appropriately.    Plan:  - ERP protocol   - Pain control: IV tylenol, IV Toradol, oxycodone prn    - Diet: advance to LRD after breakfast  - DC Wagner, FU TOV   - Activity: as tolerated   - DVT ppx: LVX   - FU AM labs, replete prn     [facility]  [id number]

## 2021-04-21 NOTE — DISCHARGE NOTE PROVIDER - NSDCMRMEDTOKEN_GEN_ALL_CORE_FT
omeprazole 20 mg oral delayed release capsule: 1 cap(s) orally once a day  oxyCODONE 5 mg oral tablet: 1 tab(s) orally every 4 hours, As needed, Severe Pain (7 - 10) MDD:3  rosuvastatin 5 mg oral tablet: 1 tab(s) orally once a day  sertraline 25 mg oral tablet: 1 tab(s) orally once a day   acetaminophen 500 mg oral tablet: 2 tab(s) orally every 6 hours  ibuprofen 400 mg oral tablet: 1 tab(s) orally every 6 hours  omeprazole 20 mg oral delayed release capsule: 1 cap(s) orally once a day  oxyCODONE 5 mg oral tablet: 1 tab(s) orally every 4 hours, As needed, Severe Pain (7 - 10) MDD:3  rosuvastatin 5 mg oral tablet: 1 tab(s) orally once a day  sertraline 25 mg oral tablet: 1 tab(s) orally once a day

## 2021-04-21 NOTE — DISCHARGE NOTE PROVIDER - HOSPITAL COURSE
71 yo F with colorectal stricture prohibiting colonoscopy, likely secondary to diverticulitis, presenting for scheduled resection.   Patient underwent surgery as scheduled. Please see operative note for details. Tolerated procedure well, was extubated in the operating room and transferred to the PACU in stable condition.     Postoperatively, the patient was started on clear liquid diet, with multimodal pain control per ERP. Wagner was removed and she voided appropriately. Diet was advanced and she had return of bowel function. Patient remained hemodynamically stable and was cleared per attending for discharge; tolerating regular diet, voiding appropriately, ambulating, and with pain well controlled on oral analgesics. 71 yo F with colorectal stricture prohibiting colonoscopy, likely secondary to diverticulitis, presenting for scheduled resection.   Patient underwent surgery as scheduled. Please see operative note for details. Tolerated procedure well, was extubated in the operating room and transferred to the PACU in stable condition.     Postoperatively, the patient was started on clear liquid diet, with multimodal pain control per ERP. Wagner was removed and she voided appropriately. Diet was advanced and she had return of bowel function. Patient remained hemodynamically stable and was cleared per attending for discharge; tolerating regular diet, voiding appropriately, ambulating, and with pain well controlled on oral analgesics. Her Caprini score was calculated and found to be below the threshold for discharge with long term VTE prophylaxis. She will f/u with Dr. Cobos in 7-10 days outpt. Will f/u with her PCP in 1-2 weeks.

## 2021-04-21 NOTE — DIETITIAN INITIAL EVALUATION ADULT. - ORAL INTAKE PTA/DIET HISTORY
52.6
Pt reports good PO intake and appetite at baseline. Consume regular diet with no restrictions. Confirms NKFA. Takes vitamin D, Biotin and probiotic supplements. Pt denies chewing/swallowing difficulty, nausea, vomiting, diarrhea, constipation PTA.

## 2021-04-21 NOTE — DIETITIAN INITIAL EVALUATION ADULT. - PHYSCIAL ASSESSMENT
well nourished Skin; free of pressure injuries per nursing flow sheets, noted with abdominal surgical incision

## 2021-04-21 NOTE — DIETITIAN INITIAL EVALUATION ADULT. - ADD RECOMMEND
1) Continue current diet as tolerated. 2) Consider addition of Ensure Surgery BID to supplement PO intake as needed. 3) Diet education provided, reinforce as needed.

## 2021-04-21 NOTE — PROGRESS NOTE ADULT - SUBJECTIVE AND OBJECTIVE BOX
STATUS POST:  Open LAR     POST OPERATIVE DAY #: 1      SUBJECTIVE:   Seen and examined at bedside. No acute events overnight Vitals stable with soft BP in the mid 90s. Mildly nauseous with somewhat relief from IV Zofran.     OBJECTIVE: T(C): 36.5 (04-21-21 @ 01:30), Max: 37 (04-20-21 @ 23:35)  HR: 94 (04-21-21 @ 01:30) (74 - 94)  BP: 99/62 (04-21-21 @ 01:30) (88/52 - 125/60)  RR: 18 (04-21-21 @ 01:30) (11 - 18)  SpO2: 95% (04-21-21 @ 01:30) (95% - 99%)  Wt(kg): --  I&O's Summary    20 Apr 2021 07:01  -  21 Apr 2021 02:34  --------------------------------------------------------  IN: 160 mL / OUT: 285 mL / NET: -125 mL      I&O's Detail    20 Apr 2021 07:01  -  21 Apr 2021 02:34  --------------------------------------------------------  IN:    Lactated Ringers: 160 mL  Total IN: 160 mL    OUT:    Indwelling Catheter - Urethral (mL): 285 mL  Total OUT: 285 mL    Total NET: -125 mL        Physical Examination:  General: NAD, resting comfortably in bed  HEENT: Normocephalic atraumatic  Respiratory: Nonlabored respirations, normal CW expansion.  Cardio: regular rate and rhythm.  Abdomen: softly distended, appropriately tender, surgical incision is c/d/i, dry gauze with tega derm.   : Wagner with clear, yellow urine  Vascular: extremities are warm and well perfused.     MEDICATIONS  (STANDING):  acetaminophen  IVPB .. 1000 milliGRAM(s) IV Intermittent every 6 hours  atorvastatin 20 milliGRAM(s) Oral at bedtime  enoxaparin Injectable 40 milliGRAM(s) SubCutaneous daily  ketorolac   Injectable 15 milliGRAM(s) IV Push every 6 hours  lactated ringers. 1000 milliLiter(s) (40 mL/Hr) IV Continuous <Continuous>  morphine PF Spinal 0.15 milliGRAM(s) IntraThecal. once  pantoprazole    Tablet 40 milliGRAM(s) Oral before breakfast  sertraline 25 milliGRAM(s) Oral daily    MEDICATIONS  (PRN):  naloxone Injectable 0.1 milliGRAM(s) IV Push every 3 minutes PRN For ANY of the following changes in patient status:  A. RR LESS THAN 10 breaths per minute, B. Oxygen saturation LESS THAN 90%, C. Sedation score of 6  ondansetron Injectable 4 milliGRAM(s) IV Push every 6 hours PRN Nausea  oxyCODONE    IR 2.5 milliGRAM(s) Oral every 4 hours PRN Moderate Pain (4 - 6)  oxyCODONE    IR 5 milliGRAM(s) Oral every 4 hours PRN Severe Pain (7 - 10)  oxyCODONE    IR 5 milliGRAM(s) Oral every 3 hours PRN Mild Pain (1 - 3)  oxyCODONE    IR 10 milliGRAM(s) Oral every 3 hours PRN Moderate Pain (4 - 6)      LABS:                     STATUS POST:  Open LAR     POST OPERATIVE DAY #: 1    Overnight events: mildly hypotensive in 90s systolic but asymptomatic with good urine output.    SUBJECTIVE:   Seen and examined at bedside. Nausea resolved. Denies flatus.    OBJECTIVE: T(C): 36.5 (04-21-21 @ 01:30), Max: 37 (04-20-21 @ 23:35)  HR: 94 (04-21-21 @ 01:30) (74 - 94)  BP: 99/62 (04-21-21 @ 01:30) (88/52 - 125/60)  RR: 18 (04-21-21 @ 01:30) (11 - 18)  SpO2: 95% (04-21-21 @ 01:30) (95% - 99%)  Wt(kg): --  I&O's Summary    20 Apr 2021 07:01  -  21 Apr 2021 02:34  --------------------------------------------------------  IN: 160 mL / OUT: 285 mL / NET: -125 mL      I&O's Detail    20 Apr 2021 07:01  -  21 Apr 2021 02:34  --------------------------------------------------------  IN:    Lactated Ringers: 160 mL  Total IN: 160 mL    OUT:    Indwelling Catheter - Urethral (mL): 285 mL  Total OUT: 285 mL    Total NET: -125 mL        Physical Examination:  General: NAD, resting comfortably in bed  HEENT: Normocephalic atraumatic  Respiratory: Nonlabored respirations, normal CW expansion.  Abdomen: softly distended, appropriately tender, surgical incision is c/d/i, dry gauze with tegaderm.   : Wagner with clear, yellow urine  Vascular: extremities are warm and well perfused.

## 2021-04-21 NOTE — DIETITIAN INITIAL EVALUATION ADULT. - OTHER INFO
Weights: Pt reports has been stable ~ 155lbs which is consistent with current dosing weight.     Pt reports good tolerance to clear liquid diet this morning. Advanced to low fiber diet for lunch. No nausea, emesis noted, denies flatus or BM. Pt unfamiliar with low fiber diet, amendable to review. Provided low-fiber nutrition therapy including importance of avoiding  fiber rich foods, fresh fruits/vegetables, whole grains, and added fiber in processed foods. Discussed chewing foods well and adequate hydration and protein intake. Discussed gradual reintroduction of fiber back into diet once cleared by MD. Pt verbalized understanding and accepted written handout. Patient with no nutrition-related questions at this time. Made aware RD remains available as needed.

## 2021-04-21 NOTE — DISCHARGE NOTE PROVIDER - NSDCCPCAREPLAN_GEN_ALL_CORE_FT
PRINCIPAL DISCHARGE DIAGNOSIS  Diagnosis: Colonic stricture  Assessment and Plan of Treatment: FOLLOW-UP: Please follow up with your primary care physician in one week regarding your hospitalization, and follow up with your surgeon in 7-10 days. Call to schedule an appointment.  BATHING: Please do not submerge wound underwater. You may shower and/or sponge bathe.  ACTIVITY: No heavy lifting or straining. Otherwise, you may return to your usual level of physical activity. If you are taking narcotic pain medication (such as Oxycodone) DO NOT drive a car, operate machinery or make important decisions.  DIET: You may return to your regular diet.  NOTIFY YOUR SURGEON IF: You have any bleeding that does not stop, any pus draining from your wound(s), any fever (over 100.4 F) or chills, persistent nausea/vomiting, persistent diarrhea, or if your pain is not controlled on your discharge pain medications.       PRINCIPAL DISCHARGE DIAGNOSIS  Diagnosis: Colonic stricture  Assessment and Plan of Treatment: FOLLOW-UP: Please follow up with your primary care physician in one week regarding your hospitalization, and follow up with your surgeon in 7-10 days. Call to schedule an appointment.  BATHING: Please do not submerge wound underwater. You may shower and/or sponge bathe.

## 2021-04-21 NOTE — PROGRESS NOTE ADULT - SUBJECTIVE AND OBJECTIVE BOX
Day 1 of Anesthesia Pain Management Service    SUBJECTIVE: Doing ok  Pain Scale Score:          [X] Refer to charted pain scores    THERAPY: s/p  150 mcg PF morphine on 4\20\2021      MEDICATIONS  (STANDING):  acetaminophen  IVPB .. 1000 milliGRAM(s) IV Intermittent every 6 hours  atorvastatin 20 milliGRAM(s) Oral at bedtime  enoxaparin Injectable 40 milliGRAM(s) SubCutaneous daily  ketorolac   Injectable 15 milliGRAM(s) IV Push every 6 hours  lactated ringers. 1000 milliLiter(s) (40 mL/Hr) IV Continuous <Continuous>  magnesium oxide 1000 milliGRAM(s) Oral two times a day with meals  morphine PF Spinal 0.15 milliGRAM(s) IntraThecal. once  pantoprazole    Tablet 40 milliGRAM(s) Oral before breakfast  sertraline 25 milliGRAM(s) Oral daily    MEDICATIONS  (PRN):  naloxone Injectable 0.1 milliGRAM(s) IV Push every 3 minutes PRN For ANY of the following changes in patient status:  A. RR LESS THAN 10 breaths per minute, B. Oxygen saturation LESS THAN 90%, C. Sedation score of 6  ondansetron Injectable 4 milliGRAM(s) IV Push every 6 hours PRN Nausea  oxyCODONE    IR 2.5 milliGRAM(s) Oral every 4 hours PRN Moderate Pain (4 - 6)  oxyCODONE    IR 5 milliGRAM(s) Oral every 4 hours PRN Severe Pain (7 - 10)      OBJECTIVE:    Sedation:        	[X] Alert	            [ ] Drowsy	[ ] Arousable      [ ] Asleep       [ ] Unresponsive    Side Effects:	[X] None 	[ ] Nausea	[ ] Vomiting         [ ] Pruritus  		[ ] Weakness            [ ] Numbness	          [ ] Other:    Vital Signs Last 24 Hrs  T(C): 36.6 (21 Apr 2021 05:37), Max: 37 (20 Apr 2021 23:35)  T(F): 97.9 (21 Apr 2021 05:37), Max: 98.6 (20 Apr 2021 23:35)  HR: 85 (21 Apr 2021 05:37) (74 - 94)  BP: 94/59 (21 Apr 2021 05:37) (88/52 - 125/60)  BP(mean): 75 (20 Apr 2021 22:30) (66 - 87)  RR: 18 (21 Apr 2021 05:37) (11 - 18)  SpO2: 95% (21 Apr 2021 05:37) (95% - 99%)    ASSESSMENT/ PLAN: Endorsing good analgesia.  [X] Patient to be transitioned to prn analgesics after 24hours  [X] Pain management per primary service, pain service to sign off   [X]Documentation and Verification of current medications

## 2021-04-21 NOTE — DIETITIAN INITIAL EVALUATION ADULT. - REASON
not indicated at this time, no overt signs of muscle and/or fat depletion noted upon visual assessment

## 2021-04-21 NOTE — DISCHARGE NOTE PROVIDER - NSDCCPTREATMENT_GEN_ALL_CORE_FT
PRINCIPAL PROCEDURE  Procedure: Open low anterior resection of colon  Findings and Treatment: WOUND CARE: Staples will be removed at follow up office visit.     BATHING: Please do not submerge wound underwater. You may shower and/or sponge bathe. Allow soapy water to run over incision site. Do not rub incision site. Pat dry when out of shower.  ACTIVITY: No heavy lifting anything more than 10-15lbs or straining. Otherwise, you may return to your usual level of physical activity. If you are taking narcotic pain medication (such as Percocet), do NOT drive a car, operate machinery or make important decisions.  DIET: Low fiber diet  NOTIFY YOUR SURGEON IF: You have any bleeding that does not stop, any pus draining from your wound, any fever (over 100.4 F) or chills, persistent nausea/vomiting with inability to tolerate food or liquids, persistent diarrhea, or if your pain is not controlled on your discharge pain medications.  FOLLOW-UP:  1. Please call to make a follow-up appointment with Dr. Cobos within one week of discharge   2. Please follow up with your primary care physician in one week regarding your hospitalization.

## 2021-04-22 ENCOUNTER — TRANSCRIPTION ENCOUNTER (OUTPATIENT)
Age: 71
End: 2021-04-22

## 2021-04-22 VITALS
DIASTOLIC BLOOD PRESSURE: 68 MMHG | RESPIRATION RATE: 18 BRPM | SYSTOLIC BLOOD PRESSURE: 106 MMHG | OXYGEN SATURATION: 94 % | TEMPERATURE: 98 F | HEART RATE: 77 BPM

## 2021-04-22 LAB
ANION GAP SERPL CALC-SCNC: 7 MMOL/L — SIGNIFICANT CHANGE UP (ref 5–17)
BUN SERPL-MCNC: 13 MG/DL — SIGNIFICANT CHANGE UP (ref 7–23)
CALCIUM SERPL-MCNC: 8.5 MG/DL — SIGNIFICANT CHANGE UP (ref 8.4–10.5)
CHLORIDE SERPL-SCNC: 106 MMOL/L — SIGNIFICANT CHANGE UP (ref 96–108)
CO2 SERPL-SCNC: 24 MMOL/L — SIGNIFICANT CHANGE UP (ref 22–31)
CREAT SERPL-MCNC: 0.72 MG/DL — SIGNIFICANT CHANGE UP (ref 0.5–1.3)
GLUCOSE SERPL-MCNC: 91 MG/DL — SIGNIFICANT CHANGE UP (ref 70–99)
HCT VFR BLD CALC: 31.1 % — LOW (ref 34.5–45)
HGB BLD-MCNC: 9.9 G/DL — LOW (ref 11.5–15.5)
MAGNESIUM SERPL-MCNC: 2.1 MG/DL — SIGNIFICANT CHANGE UP (ref 1.6–2.6)
MCHC RBC-ENTMCNC: 28.6 PG — SIGNIFICANT CHANGE UP (ref 27–34)
MCHC RBC-ENTMCNC: 31.8 GM/DL — LOW (ref 32–36)
MCV RBC AUTO: 89.9 FL — SIGNIFICANT CHANGE UP (ref 80–100)
NRBC # BLD: 0 /100 WBCS — SIGNIFICANT CHANGE UP (ref 0–0)
PHOSPHATE SERPL-MCNC: 2.7 MG/DL — SIGNIFICANT CHANGE UP (ref 2.5–4.5)
PLATELET # BLD AUTO: 276 K/UL — SIGNIFICANT CHANGE UP (ref 150–400)
POTASSIUM SERPL-MCNC: 3.9 MMOL/L — SIGNIFICANT CHANGE UP (ref 3.5–5.3)
POTASSIUM SERPL-SCNC: 3.9 MMOL/L — SIGNIFICANT CHANGE UP (ref 3.5–5.3)
RBC # BLD: 3.46 M/UL — LOW (ref 3.8–5.2)
RBC # FLD: 13 % — SIGNIFICANT CHANGE UP (ref 10.3–14.5)
SODIUM SERPL-SCNC: 137 MMOL/L — SIGNIFICANT CHANGE UP (ref 135–145)
WBC # BLD: 5.34 K/UL — SIGNIFICANT CHANGE UP (ref 3.8–10.5)
WBC # FLD AUTO: 5.34 K/UL — SIGNIFICANT CHANGE UP (ref 3.8–10.5)

## 2021-04-22 PROCEDURE — C1758: CPT

## 2021-04-22 PROCEDURE — C1889: CPT

## 2021-04-22 PROCEDURE — 82962 GLUCOSE BLOOD TEST: CPT

## 2021-04-22 PROCEDURE — 84100 ASSAY OF PHOSPHORUS: CPT

## 2021-04-22 PROCEDURE — 86850 RBC ANTIBODY SCREEN: CPT

## 2021-04-22 PROCEDURE — C1769: CPT

## 2021-04-22 PROCEDURE — 88307 TISSUE EXAM BY PATHOLOGIST: CPT

## 2021-04-22 PROCEDURE — 86900 BLOOD TYPING SEROLOGIC ABO: CPT

## 2021-04-22 PROCEDURE — 85027 COMPLETE CBC AUTOMATED: CPT

## 2021-04-22 PROCEDURE — 86901 BLOOD TYPING SEROLOGIC RH(D): CPT

## 2021-04-22 PROCEDURE — 97116 GAIT TRAINING THERAPY: CPT

## 2021-04-22 PROCEDURE — 80048 BASIC METABOLIC PNL TOTAL CA: CPT

## 2021-04-22 PROCEDURE — 83735 ASSAY OF MAGNESIUM: CPT

## 2021-04-22 PROCEDURE — C9399: CPT

## 2021-04-22 RX ORDER — IBUPROFEN 200 MG
1 TABLET ORAL
Qty: 0 | Refills: 0 | DISCHARGE
Start: 2021-04-22

## 2021-04-22 RX ORDER — ACETAMINOPHEN 500 MG
2 TABLET ORAL
Qty: 0 | Refills: 0 | DISCHARGE
Start: 2021-04-22

## 2021-04-22 RX ORDER — SODIUM,POTASSIUM PHOSPHATES 278-250MG
1 POWDER IN PACKET (EA) ORAL ONCE
Refills: 0 | Status: COMPLETED | OUTPATIENT
Start: 2021-04-22 | End: 2021-04-22

## 2021-04-22 RX ADMIN — ENOXAPARIN SODIUM 40 MILLIGRAM(S): 100 INJECTION SUBCUTANEOUS at 12:08

## 2021-04-22 RX ADMIN — Medication 1000 MILLIGRAM(S): at 06:13

## 2021-04-22 RX ADMIN — Medication 400 MILLIGRAM(S): at 07:00

## 2021-04-22 RX ADMIN — Medication 1000 MILLIGRAM(S): at 10:20

## 2021-04-22 RX ADMIN — SERTRALINE 25 MILLIGRAM(S): 25 TABLET, FILM COATED ORAL at 12:07

## 2021-04-22 RX ADMIN — Medication 1000 MILLIGRAM(S): at 09:52

## 2021-04-22 RX ADMIN — Medication 400 MILLIGRAM(S): at 07:30

## 2021-04-22 RX ADMIN — Medication 1000 MILLIGRAM(S): at 05:43

## 2021-04-22 RX ADMIN — Medication 1 PACKET(S): at 09:58

## 2021-04-22 RX ADMIN — PANTOPRAZOLE SODIUM 40 MILLIGRAM(S): 20 TABLET, DELAYED RELEASE ORAL at 05:45

## 2021-04-22 NOTE — PROGRESS NOTE ADULT - SUBJECTIVE AND OBJECTIVE BOX
STATUS POST:  Open LAR     POST OPERATIVE DAY #: 2    Overnight events: Unable to void on her own, bladder scanned for 300cc, and LR bolus of 1L provided. Voided 200cc thereafter.     SUBJECTIVE:   Seen and examined at bedside.     OBJECTIVE: T(C): 36.7 (04-22-21 @ 01:22), Max: 37.2 (04-21-21 @ 20:07)  HR: 71 (04-22-21 @ 01:22) (71 - 90)  BP: 110/69 (04-22-21 @ 01:22) (93/54 - 110/69)  RR: 18 (04-22-21 @ 01:22) (17 - 18)  SpO2: 96% (04-22-21 @ 01:22) (95% - 99%)  Wt(kg): --  I&O's Summary    20 Apr 2021 07:01  -  21 Apr 2021 07:00  --------------------------------------------------------  IN: 740 mL / OUT: 485 mL / NET: 255 mL    21 Apr 2021 07:01  -  22 Apr 2021 01:59  --------------------------------------------------------  IN: 825 mL / OUT: 470 mL / NET: 355 mL      I&O's Detail    20 Apr 2021 07:01  -  21 Apr 2021 07:00  --------------------------------------------------------  IN:    IV PiggyBack: 100 mL    Lactated Ringers: 640 mL  Total IN: 740 mL    OUT:    Indwelling Catheter - Urethral (mL): 485 mL  Total OUT: 485 mL    Total NET: 255 mL      21 Apr 2021 07:01 - 22 Apr 2021 01:59  --------------------------------------------------------  IN:    Lactated Ringers: 200 mL    Oral Fluid: 625 mL  Total IN: 825 mL    OUT:    Indwelling Catheter - Urethral (mL): 200 mL    Voided (mL): 270 mL  Total OUT: 470 mL    Total NET: 355 mL        Physical Examination:  General: NAD, resting comfortably in bed  HEENT: Normocephalic atraumatic  Respiratory: Nonlabored respirations, normal CW expansion.  Abdomen: softly distended, appropriately tender, surgical incision is c/d/i, dry gauze with tegaderm.   Vascular: extremities are warm and well perfused.     MEDICATIONS  (STANDING):  acetaminophen   Tablet .. 1000 milliGRAM(s) Oral every 6 hours  atorvastatin 20 milliGRAM(s) Oral at bedtime  enoxaparin Injectable 40 milliGRAM(s) SubCutaneous daily  ibuprofen  Tablet. 400 milliGRAM(s) Oral every 6 hours  lactated ringers Bolus 1000 milliLiter(s) IV Bolus once  magnesium oxide 1000 milliGRAM(s) Oral two times a day with meals  pantoprazole    Tablet 40 milliGRAM(s) Oral before breakfast  sertraline 25 milliGRAM(s) Oral daily    MEDICATIONS  (PRN):  oxyCODONE    IR 2.5 milliGRAM(s) Oral every 4 hours PRN Moderate Pain (4 - 6)  oxyCODONE    IR 5 milliGRAM(s) Oral every 4 hours PRN Severe Pain (7 - 10)      LABS:                        11.3   10.90 )-----------( 336      ( 21 Apr 2021 07:27 )             36.4     04-21    142  |  105  |  15  ----------------------------<  104<H>  4.6   |  25  |  0.82    Ca    8.2<L>      21 Apr 2021 07:25  Phos  4.5     04-21  Mg     2.3     04-21               STATUS POST:  Open LAR     POST OPERATIVE DAY #: 2    Overnight events: Unable to void on her own, bladder scanned for 300cc, and LR bolus of 1L provided. Voided 200cc thereafter. She is passing flatus. She is OOB and ambulating. She denies any CP, SOB, Fever, chills, n/v/d      SUBJECTIVE:   Seen and examined at bedside.     OBJECTIVE: T(C): 36.7 (04-22-21 @ 01:22), Max: 37.2 (04-21-21 @ 20:07)  HR: 71 (04-22-21 @ 01:22) (71 - 90)  BP: 110/69 (04-22-21 @ 01:22) (93/54 - 110/69)  RR: 18 (04-22-21 @ 01:22) (17 - 18)  SpO2: 96% (04-22-21 @ 01:22) (95% - 99%)  Wt(kg): --  I&O's Summary    20 Apr 2021 07:01  -  21 Apr 2021 07:00  --------------------------------------------------------  IN: 740 mL / OUT: 485 mL / NET: 255 mL    21 Apr 2021 07:01 - 22 Apr 2021 01:59  --------------------------------------------------------  IN: 825 mL / OUT: 470 mL / NET: 355 mL      I&O's Detail    20 Apr 2021 07:01 - 21 Apr 2021 07:00  --------------------------------------------------------  IN:    IV PiggyBack: 100 mL    Lactated Ringers: 640 mL  Total IN: 740 mL    OUT:    Indwelling Catheter - Urethral (mL): 485 mL  Total OUT: 485 mL    Total NET: 255 mL      21 Apr 2021 07:01  -  22 Apr 2021 01:59  --------------------------------------------------------  IN:    Lactated Ringers: 200 mL    Oral Fluid: 625 mL  Total IN: 825 mL    OUT:    Indwelling Catheter - Urethral (mL): 200 mL    Voided (mL): 270 mL  Total OUT: 470 mL    Total NET: 355 mL        Physical Examination:  General: NAD, resting comfortably in bed  HEENT: Normocephalic atraumatic  Respiratory: Nonlabored respirations, normal CW expansion.  Abdomen: softly distended, appropriately tender, surgical incision is c/d/i, dry gauze with tegaderm.   Vascular: extremities are warm and well perfused.     MEDICATIONS  (STANDING):  acetaminophen   Tablet .. 1000 milliGRAM(s) Oral every 6 hours  atorvastatin 20 milliGRAM(s) Oral at bedtime  enoxaparin Injectable 40 milliGRAM(s) SubCutaneous daily  ibuprofen  Tablet. 400 milliGRAM(s) Oral every 6 hours  lactated ringers Bolus 1000 milliLiter(s) IV Bolus once  magnesium oxide 1000 milliGRAM(s) Oral two times a day with meals  pantoprazole    Tablet 40 milliGRAM(s) Oral before breakfast  sertraline 25 milliGRAM(s) Oral daily    MEDICATIONS  (PRN):  oxyCODONE    IR 2.5 milliGRAM(s) Oral every 4 hours PRN Moderate Pain (4 - 6)  oxyCODONE    IR 5 milliGRAM(s) Oral every 4 hours PRN Severe Pain (7 - 10)      LABS:                        11.3   10.90 )-----------( 336      ( 21 Apr 2021 07:27 )             36.4     04-21    142  |  105  |  15  ----------------------------<  104<H>  4.6   |  25  |  0.82    Ca    8.2<L>      21 Apr 2021 07:25  Phos  4.5     04-21  Mg     2.3     04-21

## 2021-04-22 NOTE — PROGRESS NOTE ADULT - ASSESSMENT
70yFemale patient POD#2 S/P open LAR on 4/20, recovering appropriately.    Plan:  - ERP protocol   - Pain control: IV tylenol, IV Toradol, oxycodone prn    - Diet: LRD  - FU UOP  - Activity: as tolerated   - DVT ppx: LVX   - FU AM labs, replete prn     Red Surg  [id number]  70yFemale patient POD#2 S/P open LAR on 4/20, recovering appropriately.     Plan:  - ERP protocol   - Pain control: IV tylenol, IV Toradol, oxycodone prn    - Diet: LRD  - FU UOP  - Activity: as tolerated   - DVT ppx: LVX   - FU AM labs, replete prn   -Dispo plan home today    Red Surg  [id number]

## 2021-04-22 NOTE — DISCHARGE NOTE NURSING/CASE MANAGEMENT/SOCIAL WORK - PATIENT PORTAL LINK FT
You can access the FollowMyHealth Patient Portal offered by [facility] by registering at the following website: [email or web address]. By joining [email or web address]’s FollowMyHealth portal, you will also be able to view your health information using other applications (apps) compatible with our system.

## 2021-04-23 ENCOUNTER — NON-APPOINTMENT (OUTPATIENT)
Age: 71
End: 2021-04-23

## 2021-04-26 LAB — SURGICAL PATHOLOGY STUDY: SIGNIFICANT CHANGE UP

## 2021-04-30 ENCOUNTER — APPOINTMENT (OUTPATIENT)
Dept: COLORECTAL SURGERY | Facility: CLINIC | Age: 71
End: 2021-04-30
Payer: COMMERCIAL

## 2021-04-30 PROCEDURE — 99024 POSTOP FOLLOW-UP VISIT: CPT

## 2021-04-30 NOTE — HISTORY OF PRESENT ILLNESS
[FreeTextEntry1] : The patient is a 70-year-old female who presents for her first postoperative visit.\par \par She is 10 days status post anterior resection for an anatomic abnormality in the rectosigmoid preventing proximal passage of the colonoscope. The patient had a rapid and uneventful postoperative recuperation and was discharged home on postoperative day #2.\par \par She looks and feels well. She is experiencing some anterior resection syndrome with pelvic pressure. She is tolerating diet and having bowel movements. She denies rectal bleeding or temperature elevation.\par \par Pathology was consistent with endometriosis involving the colon causing severe angulation and luminal narrowing.\par \par Physical examination reveals a soft, nontender, nondistended abdomen. The specimen extraction site in the midline is healing nicely with no evidence of infection. Surgical staples were removed.\par \par The patient was asked to take a daily bulk forming laxative and increase her fluid intake. I will perform  a colonoscopy on her in approximately 4-6 weeks as she was unable to have a complete colonoscopy preoperatively. Virtual colonoscopy revealed a possible 1 cm distal transverse colon polyp.\par \par Preparatory instructions were reviewed and I will see her in 4-6 weeks.

## 2021-05-18 ENCOUNTER — NON-APPOINTMENT (OUTPATIENT)
Age: 71
End: 2021-05-18

## 2021-05-18 DIAGNOSIS — H25.9 UNSPECIFIED AGE-RELATED CATARACT: ICD-10-CM

## 2021-05-18 DIAGNOSIS — E04.1 NONTOXIC SINGLE THYROID NODULE: ICD-10-CM

## 2021-05-18 DIAGNOSIS — Z92.89 PERSONAL HISTORY OF OTHER MEDICAL TREATMENT: ICD-10-CM

## 2021-05-18 DIAGNOSIS — Z82.49 FAMILY HISTORY OF ISCHEMIC HEART DISEASE AND OTHER DISEASES OF THE CIRCULATORY SYSTEM: ICD-10-CM

## 2021-05-18 DIAGNOSIS — Z87.01 PERSONAL HISTORY OF PNEUMONIA (RECURRENT): ICD-10-CM

## 2021-05-18 DIAGNOSIS — Z80.8 FAMILY HISTORY OF MALIGNANT NEOPLASM OF OTHER ORGANS OR SYSTEMS: ICD-10-CM

## 2021-05-18 DIAGNOSIS — E55.9 VITAMIN D DEFICIENCY, UNSPECIFIED: ICD-10-CM

## 2021-05-18 RX ORDER — MULTIVIT-MIN/FOLIC/VIT K/LYCOP 400-300MCG
25 MCG TABLET ORAL
Refills: 0 | Status: ACTIVE | COMMUNITY

## 2021-05-25 RX ORDER — NEOMYCIN SULFATE 500 MG/1
500 TABLET ORAL
Qty: 3 | Refills: 0 | Status: DISCONTINUED | COMMUNITY
Start: 2021-03-05 | End: 2021-05-25

## 2021-05-25 RX ORDER — METRONIDAZOLE 500 MG/1
500 TABLET ORAL
Qty: 3 | Refills: 0 | Status: DISCONTINUED | COMMUNITY
Start: 2021-03-05 | End: 2021-05-25

## 2021-06-03 ENCOUNTER — APPOINTMENT (OUTPATIENT)
Dept: DISASTER EMERGENCY | Facility: CLINIC | Age: 71
End: 2021-06-03

## 2021-06-06 ENCOUNTER — NON-APPOINTMENT (OUTPATIENT)
Age: 71
End: 2021-06-06

## 2021-06-07 ENCOUNTER — APPOINTMENT (OUTPATIENT)
Dept: COLORECTAL SURGERY | Facility: CLINIC | Age: 71
End: 2021-06-07
Payer: COMMERCIAL

## 2021-06-07 PROCEDURE — 99072 ADDL SUPL MATRL&STAF TM PHE: CPT

## 2021-06-07 PROCEDURE — 45303 PROCTOSIGMOIDOSCOPY DILATE: CPT | Mod: 58

## 2021-09-20 ENCOUNTER — APPOINTMENT (OUTPATIENT)
Dept: ULTRASOUND IMAGING | Facility: CLINIC | Age: 71
End: 2021-09-20

## 2021-09-28 ENCOUNTER — OUTPATIENT (OUTPATIENT)
Dept: OUTPATIENT SERVICES | Facility: HOSPITAL | Age: 71
LOS: 1 days | End: 2021-09-28
Payer: COMMERCIAL

## 2021-09-28 ENCOUNTER — RESULT REVIEW (OUTPATIENT)
Age: 71
End: 2021-09-28

## 2021-09-28 ENCOUNTER — APPOINTMENT (OUTPATIENT)
Dept: ULTRASOUND IMAGING | Facility: CLINIC | Age: 71
End: 2021-09-28
Payer: COMMERCIAL

## 2021-09-28 DIAGNOSIS — Z98.890 OTHER SPECIFIED POSTPROCEDURAL STATES: Chronic | ICD-10-CM

## 2021-09-28 DIAGNOSIS — Z98.49 CATARACT EXTRACTION STATUS, UNSPECIFIED EYE: Chronic | ICD-10-CM

## 2021-09-28 DIAGNOSIS — E04.2 NONTOXIC MULTINODULAR GOITER: ICD-10-CM

## 2021-09-28 PROCEDURE — 76536 US EXAM OF HEAD AND NECK: CPT

## 2021-09-28 PROCEDURE — 76536 US EXAM OF HEAD AND NECK: CPT | Mod: 26

## 2021-10-04 ENCOUNTER — NON-APPOINTMENT (OUTPATIENT)
Age: 71
End: 2021-10-04

## 2021-10-15 ENCOUNTER — APPOINTMENT (OUTPATIENT)
Dept: FAMILY MEDICINE | Facility: CLINIC | Age: 71
End: 2021-10-15
Payer: COMMERCIAL

## 2021-10-15 PROCEDURE — 36415 COLL VENOUS BLD VENIPUNCTURE: CPT

## 2021-10-16 PROBLEM — F32.9 MAJOR DEPRESSIVE DISORDER, SINGLE EPISODE, UNSPECIFIED: Status: ACTIVE | Noted: 2021-05-18

## 2021-10-16 PROBLEM — C44.91 BCC (BASAL CELL CARCINOMA): Status: ACTIVE | Noted: 2021-10-16

## 2021-10-16 LAB
ALBUMIN SERPL ELPH-MCNC: 4.7 G/DL
ALP BLD-CCNC: 81 U/L
ALT SERPL-CCNC: 13 U/L
ANION GAP SERPL CALC-SCNC: 14 MMOL/L
AST SERPL-CCNC: 17 U/L
BASOPHILS # BLD AUTO: 0.05 K/UL
BASOPHILS NFR BLD AUTO: 0.9 %
BILIRUB SERPL-MCNC: 0.3 MG/DL
BUN SERPL-MCNC: 23 MG/DL
CALCIUM SERPL-MCNC: 9.7 MG/DL
CHLORIDE SERPL-SCNC: 104 MMOL/L
CHOLEST SERPL-MCNC: 210 MG/DL
CO2 SERPL-SCNC: 23 MMOL/L
CREAT SERPL-MCNC: 0.77 MG/DL
EOSINOPHIL # BLD AUTO: 0.04 K/UL
EOSINOPHIL NFR BLD AUTO: 0.7 %
GLUCOSE SERPL-MCNC: 92 MG/DL
HCT VFR BLD CALC: 43.7 %
HDLC SERPL-MCNC: 102 MG/DL
HGB BLD-MCNC: 13.5 G/DL
IMM GRANULOCYTES NFR BLD AUTO: 0.2 %
LDLC SERPL CALC-MCNC: 99 MG/DL
LYMPHOCYTES # BLD AUTO: 0.83 K/UL
LYMPHOCYTES NFR BLD AUTO: 14.8 %
MAN DIFF?: NORMAL
MCHC RBC-ENTMCNC: 28.4 PG
MCHC RBC-ENTMCNC: 30.9 GM/DL
MCV RBC AUTO: 91.8 FL
MONOCYTES # BLD AUTO: 0.4 K/UL
MONOCYTES NFR BLD AUTO: 7.1 %
NEUTROPHILS # BLD AUTO: 4.27 K/UL
NEUTROPHILS NFR BLD AUTO: 76.3 %
NONHDLC SERPL-MCNC: 108 MG/DL
PLATELET # BLD AUTO: 394 K/UL
POTASSIUM SERPL-SCNC: 4.6 MMOL/L
PROT SERPL-MCNC: 7.1 G/DL
RBC # BLD: 4.76 M/UL
RBC # FLD: 13.5 %
SODIUM SERPL-SCNC: 141 MMOL/L
TRIGL SERPL-MCNC: 48 MG/DL
TSH SERPL-ACNC: 1.59 UIU/ML
WBC # FLD AUTO: 5.6 K/UL

## 2021-10-16 RX ORDER — OMEPRAZOLE 20 MG/1
20 CAPSULE, DELAYED RELEASE ORAL
Qty: 90 | Refills: 0 | Status: ACTIVE | COMMUNITY

## 2021-10-16 NOTE — REASON FOR VISIT
Continued Stay Review    Date:       2/1/21                    Current Patient Class:    INpatient  Current Level of Care:   Med surg    HPI:89 y o  female initially admitted on    1/18     With Cellulitis  LLE, ambulatory   Dysfunction,, hyperkalemia, LILIAN, hyponatremia     Assessment/Plan:   2/1     Patient with gangrene involving toes of  Right foot  Now agrees to right lower extremity angiogram   Wait  IR consult  Plan angiogram later in  Week  Needs renal optimization prior to angiogram     Creatinine  Today  2 47  Give  1 dose  IV lasix today  Continue strict  I & O, daily weight, monitor labs  Can be WBAT   Bilateral LE  Continue local wound care  Bilateral legs        Pertinent Labs/Diagnostic Results:       Results from last 7 days   Lab Units 02/01/21  0650 01/31/21  0516 01/30/21  1524 01/27/21  1012 01/26/21  0541   WBC Thousand/uL 5 70 6 40 6 39 8 52 8 99   HEMOGLOBIN g/dL 8 2* 8 3* 8 0* 8 1* 7 7*   HEMATOCRIT % 27 6* 27 1* 26 0* 25 9* 24 6*   PLATELETS Thousands/uL 257 288 272 196 197   NEUTROS ABS Thousands/µL 3 15 3 47  --  5 94 5 82         Results from last 7 days   Lab Units 02/01/21  0911 01/31/21  0516 01/30/21  0501 01/29/21  0438 01/28/21  0636 01/27/21  0517   SODIUM mmol/L 135* 137 135* 138 135* 133*   POTASSIUM mmol/L 3 5 3 5 4 3 3 3* 3 5 3 8   CHLORIDE mmol/L 101 101 100 101 99* 98*   CO2 mmol/L 27 27 26 25 26 26   ANION GAP mmol/L 7 9 9 12 10 9   BUN mg/dL 68* 79* 83* 88* 92* 93*   CREATININE mg/dL 2 47* 2 72* 2 76* 2 82* 2 72* 2 76*   EGFR ml/min/1 73sq m 17 15 15 14 15 15   CALCIUM mg/dL 8 0* 8 0* 8 5 8 5 8 4 8 4   MAGNESIUM mg/dL 1 8 2 0 2 2  --  2 3 2 4   PHOSPHORUS mg/dL  --   --  5 3*  --   --   --              Results from last 7 days   Lab Units 02/01/21  0911 01/31/21  0516 01/30/21  0501 01/29/21  0438 01/28/21  0636 01/27/21  0517 01/26/21  0541   GLUCOSE RANDOM mg/dL 124 97 91 88 101 98 90           Results from last 7 days   Lab Units 02/01/21  0652 01/31/21  3167 01/31/21  1646  01/31/21  0516  01/30/21  0501 01/29/21  0438   PROTIME seconds  --   --   --   --  23 0*  --  21 1* 19 7*   INR   --   --   --   --  2 09*  --  1 87* 1 71*   PTT seconds 75* 49* 108*   < >  --    < >  --   --     < > = values in this interval not displayed  Vital Signs:     66    144/66            Sitting     02/01/21 0735  97 9 °F (36 6 °C)  68  18  164/79    93 %        Lying   02/01/21 0630        163/78                     Medications:   Scheduled Medications:  amLODIPine, 10 mg, Oral, Daily  carvedilol, 12 5 mg, Oral, BID With Meals  collagenase, , Topical, Daily  hydrALAZINE, 25 mg, Oral, Q8H NATASHA  isosorbide dinitrate, 10 mg, Oral, TID after meals  levothyroxine, 25 mcg, Oral, Early Morning  potassium chloride, 20 mEq, Oral, Once  saccharomyces boulardii, 250 mg, Oral, BID      Continuous IV Infusions:  heparin (porcine), 3-30 Units/kg/hr (Order-Specific), Intravenous, Titrated      PRN Meds:  acetaminophen, 650 mg, Oral, Q6H PRN  HYDROmorphone, 1 mg, Intravenous, Q4H PRN  ondansetron, 4 mg, Intravenous, Q6H PRN  oxyCODONE, 2 5 mg, Oral, Q4H PRN  oxyCODONE, 5 mg, Oral, Q4H PRN        Discharge Plan:    D    Network Utilization Review Department  ATTENTION: Please call with any questions or concerns to [phone] and carefully listen to the prompts so that you are directed to the right person  All voicemails are confidential   Anita Mariee all requests for admission clinical reviews, approved or denied determinations and any other requests to dedicated fax number below belonging to the campus where the patient is receiving treatment   List of dedicated fax numbers for the Facilities:  [address] DENIALS (Administrative/Medical Necessity) [phone]   [address] (Maternity/NICU/Pediatrics) [phone] [address] [address] 4[address] [facility] Lizbeth [facility] [phone]   [facility] [address] [address] [place] [address] [address] [phone]   [address] [phone] [Annual Wellness Visit] : an annual wellness visit

## 2021-10-22 ENCOUNTER — APPOINTMENT (OUTPATIENT)
Dept: FAMILY MEDICINE | Facility: CLINIC | Age: 71
End: 2021-10-22
Payer: COMMERCIAL

## 2021-10-22 ENCOUNTER — MED ADMIN CHARGE (OUTPATIENT)
Age: 71
End: 2021-10-22

## 2021-10-22 VITALS
HEIGHT: 64 IN | WEIGHT: 157 LBS | DIASTOLIC BLOOD PRESSURE: 62 MMHG | BODY MASS INDEX: 26.8 KG/M2 | TEMPERATURE: 97.1 F | OXYGEN SATURATION: 96 % | SYSTOLIC BLOOD PRESSURE: 118 MMHG | HEART RATE: 70 BPM

## 2021-10-22 DIAGNOSIS — F32.9 MAJOR DEPRESSIVE DISORDER, SINGLE EPISODE, UNSPECIFIED: ICD-10-CM

## 2021-10-22 DIAGNOSIS — Z23 ENCOUNTER FOR IMMUNIZATION: ICD-10-CM

## 2021-10-22 DIAGNOSIS — C44.91 BASAL CELL CARCINOMA OF SKIN, UNSPECIFIED: ICD-10-CM

## 2021-10-22 DIAGNOSIS — N81.9 FEMALE GENITAL PROLAPSE, UNSPECIFIED: ICD-10-CM

## 2021-10-22 PROCEDURE — 99397 PER PM REEVAL EST PAT 65+ YR: CPT | Mod: 25

## 2021-10-22 PROCEDURE — G0008: CPT

## 2021-10-22 PROCEDURE — 90686 IIV4 VACC NO PRSV 0.5 ML IM: CPT

## 2021-10-22 RX ORDER — SODIUM PICOSULFATE, MAGNESIUM OXIDE, AND ANHYDROUS CITRIC ACID 10; 3.5; 12 MG/160ML; G/160ML; G/160ML
10-3.5-12 MG-GM LIQUID ORAL
Qty: 2 | Refills: 0 | Status: DISCONTINUED | COMMUNITY
Start: 2021-05-28 | End: 2021-10-22

## 2021-10-22 NOTE — PLAN
[FreeTextEntry1] : Reviewed age-appropriate preventive screening tests with patient. UTD on colonoscopy, gyn, mammo and ?DEXA (she can check with gyn about that).\par \par Flu vacc revd/recommended and given\par \par Revd recent labs which are all wnl. Cont same meds/doses as she is currently taking. \par \par Revd clean eating (eg Mediterranean style eating plan) and regular exercise/staying as physically active as possible. Work on core strengthening and stretching/yoga/pilates exercises for low back/hips etc.\par \par Reviewed importance of good self care (eg meditation, yoga, adequate rest, regular exercise, magnesium, clean eating etc).\par \par She is wondering if she really needs sertraline anymore and may want to do trial off med but stress levels are still a bit high given 's health issues. She will cont 25 mg daily for as long as desired/needed and if/when she wants to try weaning med she can take 1/2 pill daily x a couple of weeks and then stop but ok to restart if needed for any recurrent mood sxs. \par \par Next PE in 1 yr. RTO 6 mos for chol etc f/u visit and fasting labs.

## 2021-10-22 NOTE — REVIEW OF SYSTEMS
[Heartburn] : heartburn [Joint Pain] : joint pain [Joint Stiffness] : joint stiffness [Depression] : depression [Negative] : Heme/Lymph [Abdominal Pain] : no abdominal pain [Constipation] : constipation [Diarrhea] : diarrhea [Vomiting] : no vomiting [Melena] : no melena [Headache] : no headache [Dizziness] : no dizziness [Memory Loss] : no memory loss [Unsteady Walking] : no ataxia [Suicidal] : not suicidal [Insomnia] : no insomnia [Anxiety] : no anxiety [FreeTextEntry7] : +GERD, well controlled on daily PPI med, constipation and diarrhea and gassiness since bowel surg Summer 2021, see HPI [FreeTextEntry9] : OA related joint stiffness and pain that she manages with keeping physically active  [de-identified] : R leg numbness from spinal nerve root impingement L/S spine, see HPI,  [de-identified] : h/o depression, feeling well on low dose sertraline and wishes to cont same for now as still higher stress in life but would like to wean off med in near future once stress settles a bit

## 2021-10-22 NOTE — ASSESSMENT
[FreeTextEntry1] : CHINA WU is a 71 year old female here for a physical exam and f/u of above noted medical issues.\par \par \par

## 2021-10-22 NOTE — HISTORY OF PRESENT ILLNESS
[de-identified] : Her last PE was 10/2020\par \par Her last tetanus shot was 6/2016\par Pneumovax 10/2015, Prevnar 9/2016\par Shingrix 1/2019, 6/2019\par She has had COVID vacc series (pfizer) and plans booster in near future \par \par Her diet is clean/healthful overall\par Exercises regularly-- back at the gym\par \par Her last colonoscopy was 6/2021 well healed surgical site s/p partial colectomy (see below) , rpt due 5 yrs, Dr. Cobos\par Her last mammogram was 11/2020\par Her last DEXA was 1/2019 -1.3 L spine\par Her last gyn exam was 2/2021 (Dr. Milan--> Dr. Duenas)-- also had eval with Dr. Thacker about pelvic organ prolapse and this is still mild enough that is not causing her signif sxs so is being monitored for now without surgical intervention. \par \par Zonia also has a h/o hypercholesterolemia,  thyroid nodule,  GERD,  osteopenia, and depression. \par \par She is taking meds consistently and tolerating them well. \par \par Had card eval in 2019 and all was wnl in the end (failed ETT so had to do nuclear stress test that was wnl, carotid US showed mild plaque, coronary CT score was only 9). Dr. Kelly recommended statin med based on her LDL level and she is tolerating it well\par \par Had EGD 2019 and showed HH and otherwise was wnl. Takes PPI med daily and even if tries to skip a day has breakthrough sxs so she will cont daily for now but we revd ok to periodically test to see if she can get by with less than daily freq of use\par \par Had surveillance colonoscopy (Dr. Seals) earlier this year and GI could not complete the colonoscopy even with peds scope due to stricture (but was told there was also a rectal polyp but for some reason this was not resected). She had to have virtual colonoscopy the next day which showed an area of concern in prox 1/3 of colon but was told it was "nothing to be concerned about". She went to see Dr Cobos for eval and he dx her with colonic stricture in sigmoid colon which was resected 4/2021 with primary reanastamosis. Has recovered from surgery but her GI system is not yet back to normal. Has bloating and gassiness and alternating between constipation and diarrhea. Is eating high fiber and hydrating well. Using Miralax as needed. We disc consider adding Magnesium citrate (eg Calm) daily with goal of keeping stools on the slightly softer side if poss. \par \par Last thyroid US was 9/2021 and all is stable since 12/2016. We disc option to cont annual US vs. d/c further US and she prefers to cont to screen annually to keep an eye on nodule/calcifications\par \par Has had more right leg pain and numbness lionel with prolonged sitting (lionel in car). Seen at [facility]. Had MRI L/S spine which shows some pinched nerve roots. Had Meloxicam but caused nausea and facial flushing so stopped. Now taking Celebrex w/ food prn and helps (limits use of med to only when really needed). [facility] [employer] (had been doing a class but thinks that tweaked her back so will either do different classes or if does same class will use modifcations). Also working on strengthening her core

## 2021-10-22 NOTE — PHYSICAL EXAM
[No Acute Distress] : no acute distress [Well Nourished] : well nourished [Well Developed] : well developed [Well-Appearing] : well-appearing [Normal Sclera/Conjunctiva] : normal sclera/conjunctiva [EOMI] : extraocular movements intact [Normal Outer Ear/Nose] : the outer ears and nose were normal in appearance [No JVD] : no jugular venous distention [No Lymphadenopathy] : no lymphadenopathy [Supple] : supple [Thyroid Normal, No Nodules] : the thyroid was normal and there were no nodules present [No Respiratory Distress] : no respiratory distress  [No Accessory Muscle Use] : no accessory muscle use [Clear to Auscultation] : lungs were clear to auscultation bilaterally [Normal Rate] : normal rate  [Regular Rhythm] : with a regular rhythm [Normal S1, S2] : normal S1 and S2 [No Murmur] : no murmur heard [No Carotid Bruits] : no carotid bruits [No Varicosities] : no varicosities [Pedal Pulses Present] : the pedal pulses are present [No Edema] : there was no peripheral edema [No Extremity Clubbing/Cyanosis] : no extremity clubbing/cyanosis [Soft] : abdomen soft [Non Tender] : non-tender [Non-distended] : non-distended [No Masses] : no abdominal mass palpated [No HSM] : no HSM [Normal Bowel Sounds] : normal bowel sounds [Normal Posterior Cervical Nodes] : no posterior cervical lymphadenopathy [Normal Anterior Cervical Nodes] : no anterior cervical lymphadenopathy [No Joint Swelling] : no joint swelling [Grossly Normal Strength/Tone] : grossly normal strength/tone [No Rash] : no rash [Coordination Grossly Intact] : coordination grossly intact [No Focal Deficits] : no focal deficits [Normal Gait] : normal gait [Deep Tendon Reflexes (DTR)] : deep tendon reflexes were 2+ and symmetric [Normal Affect] : the affect was normal [Normal Insight/Judgement] : insight and judgment were intact [de-identified] : well healing midline vertical scar infer to umbilicus

## 2021-12-21 NOTE — DISCHARGE NOTE PROVIDER - CARE PROVIDER_API CALL
Saad Cobos)  ColonRectal Surgery; Surgery  [address], [address]  [place] [address]  Phone: [phone]  Fax: [phone]  Established Patient  Follow Up Time: 2 weeks   Patent

## 2022-02-11 ENCOUNTER — OUTPATIENT (OUTPATIENT)
Dept: OUTPATIENT SERVICES | Facility: HOSPITAL | Age: 72
LOS: 1 days | End: 2022-02-11
Payer: COMMERCIAL

## 2022-02-11 ENCOUNTER — APPOINTMENT (OUTPATIENT)
Dept: RADIOLOGY | Facility: CLINIC | Age: 72
End: 2022-02-11
Payer: COMMERCIAL

## 2022-02-11 ENCOUNTER — APPOINTMENT (OUTPATIENT)
Dept: FAMILY MEDICINE | Facility: CLINIC | Age: 72
End: 2022-02-11
Payer: COMMERCIAL

## 2022-02-11 ENCOUNTER — RESULT REVIEW (OUTPATIENT)
Age: 72
End: 2022-02-11

## 2022-02-11 VITALS
DIASTOLIC BLOOD PRESSURE: 72 MMHG | TEMPERATURE: 97.9 F | WEIGHT: 155 LBS | BODY MASS INDEX: 26.46 KG/M2 | OXYGEN SATURATION: 98 % | HEART RATE: 81 BPM | SYSTOLIC BLOOD PRESSURE: 116 MMHG | HEIGHT: 64 IN

## 2022-02-11 DIAGNOSIS — M25.561 PAIN IN RIGHT KNEE: ICD-10-CM

## 2022-02-11 DIAGNOSIS — Z98.890 OTHER SPECIFIED POSTPROCEDURAL STATES: Chronic | ICD-10-CM

## 2022-02-11 DIAGNOSIS — Z98.49 CATARACT EXTRACTION STATUS, UNSPECIFIED EYE: Chronic | ICD-10-CM

## 2022-02-11 PROCEDURE — 73564 X-RAY EXAM KNEE 4 OR MORE: CPT

## 2022-02-11 PROCEDURE — 99213 OFFICE O/P EST LOW 20 MIN: CPT

## 2022-02-11 PROCEDURE — 73564 X-RAY EXAM KNEE 4 OR MORE: CPT | Mod: 26,RT

## 2022-02-11 RX ORDER — ROSUVASTATIN CALCIUM 5 MG/1
5 TABLET, FILM COATED ORAL
Refills: 0 | Status: DISCONTINUED | COMMUNITY
End: 2022-02-11

## 2022-02-11 NOTE — PHYSICAL EXAM
[Normal Outer Ear/Nose] : the outer ears and nose were normal in appearance [Normal] : normal rate, regular rhythm, normal S1 and S2 and no murmur heard [No Edema] : there was no peripheral edema [No Rash] : no rash [Coordination Grossly Intact] : coordination grossly intact [No Focal Deficits] : no focal deficits [Normal Gait] : normal gait [Normal Affect] : the affect was normal [Normal Insight/Judgement] : insight and judgment were intact [de-identified] : Right lower extremity:  ecchymosis over the anterior knee joint and on the shin s/p fall.  swelling behind the right knee.  tenderness over the anterior and posterior knee joint.  full ROM of the knee.  sensation intact.  strength 5/5.  DP and PT pulses 2+ and strong.  no size discrepancy between the bilateral calves.

## 2022-02-11 NOTE — ASSESSMENT
[FreeTextEntry1] : Patient is a 70yo female presenting to the office s/p mechanical fall 1.5 weeks ago with direct trauma to the right knee with persistent pain to the right knee, with swelling noted to the posterior aspect of the right knee joint.\par \par Right Knee Pain/swelling\par - XR right knee.\par - US right knee joint to evaluate swelling in posterior knee.\par - Ibuprofen/Acetaminophen as needed for pain.\par - Rest, ice, compression, elevation.\par - Follow up with Orthopedics (Dr. Rodriguez's information provided.\par - Follow up with our office PRN.\par

## 2022-02-11 NOTE — HEALTH RISK ASSESSMENT
[Never] : Never [Yes] : Yes [2 - 4 times a month (2 pts)] : 2-4 times a month (2 points) [1 or 2 (0 pts)] : 1 or 2 (0 points) [Never (0 pts)] : Never (0 points) [No] : In the past 12 months have you used drugs other than those required for medical reasons? No [One fall no injury in past year] : Patient reported one fall in the past year without injury [0] : 2) Feeling down, depressed, or hopeless: Not at all (0) [PHQ-2 Negative - No further assessment needed] : PHQ-2 Negative - No further assessment needed [Audit-CScore] : 2 [de-identified] : treadmill, gym, classes [de-identified] : well balanced [[id number]] : 0

## 2022-02-11 NOTE — HISTORY OF PRESENT ILLNESS
[FreeTextEntry8] : Patient is a 72yo female presenting to the office complaining of right knee pain.\par Pt states she had mechanical trip 1.5 weeks ago\par tripped going up a step, fell down onto the stone floor directly onto the right knee.\par pt able to ambulate without assistance with minimal pain.\par noticed a lump behind the right knee a few days ago\par states it is hurting her to bend the knee\par took Celebrex and Tylenol for discomfort\par has not been sedentary since injury, denies prior history of blood clots, recent travel, recent surgeries or hormone use.\par \par pt denies prior history of right knee injuries/surgeries\par Follows with Pete for low back pain/sciatica

## 2022-02-17 ENCOUNTER — APPOINTMENT (OUTPATIENT)
Dept: ULTRASOUND IMAGING | Facility: CLINIC | Age: 72
End: 2022-02-17

## 2022-02-23 ENCOUNTER — APPOINTMENT (OUTPATIENT)
Dept: ORTHOPEDIC SURGERY | Facility: CLINIC | Age: 72
End: 2022-02-23

## 2022-03-02 ENCOUNTER — APPOINTMENT (OUTPATIENT)
Dept: NEUROLOGY | Facility: CLINIC | Age: 72
End: 2022-03-02
Payer: COMMERCIAL

## 2022-03-02 VITALS
WEIGHT: 155 LBS | HEIGHT: 64 IN | TEMPERATURE: 98 F | HEART RATE: 92 BPM | BODY MASS INDEX: 26.46 KG/M2 | SYSTOLIC BLOOD PRESSURE: 110 MMHG | DIASTOLIC BLOOD PRESSURE: 72 MMHG

## 2022-03-02 PROCEDURE — 99203 OFFICE O/P NEW LOW 30 MIN: CPT

## 2022-03-02 NOTE — HISTORY OF PRESENT ILLNESS
[FreeTextEntry1] : 71 year-old woman complaining of 8 months of right-sided leg pain, since the start of the buttocks, radiates to the anterolateral, posterior aspect of the leg into the knee area, associated with a tingling sensation in the thigh.The pain is worse with sitting, has difficulty lying on her right side,a little better walking. Has noticed some weakness of the right leg, and reports 2 falls in the last 6 months. No change in bowel or bladder habits. No fever chills, nausea malignancy.No prior history of trauma or injury reported.she takes Celebrex occasional Advil for relief.She underwent an MRI of the lumbosacral spine without contrast, perform October 12, 2001, which is read as Desyrel levoscoliosis, mild exaggerated lumbar lordosis, and slight anterolisthesis in the mid to lower lumbar spine with multilevel degenerative disease and multilevel foraminal narrowing more prominent on the left at L3-L4, and on the right at L4-L5, without acute fracture, central stenosis, pars defect, or exiting nerve root impingement.\par She does admit to a history of chronic right hip pain, diagnosed with bursitis of her years ago.\par She had x-rays of the right knee, which demonstrated mild degenerative changes, she denies any neck, upper back or mid back pain.

## 2022-03-02 NOTE — REVIEW OF SYSTEMS
[Leg Weakness] : leg weakness [Numbness] : numbness [Abnormal Sensation] : an abnormal sensation [As Noted in HPI] : as noted in HPI [Joint Pain] : joint pain [Limb Pain] : limb pain [Negative] : Heme/Lymph

## 2022-03-02 NOTE — DISCUSSION/SUMMARY
[FreeTextEntry1] : 71-year-old woman, complaining of chronic right leg pain, reports paresthesias, neurological examination is unremarkable.\par Plan: Rule out thoracic spine, cord involvement. We'll order an MRI of the thoracic spine without contrast.\par Order an electromyography and nerve conduction study of the right leg, rule out radiculopathy.\par Gabapentin 300 mg twice a day, for pain.\par Advice to followup with orthopedics, perhaps pain management for treating right hip pain.\par

## 2022-03-02 NOTE — PHYSICAL EXAM
[General Appearance - Alert] : alert [General Appearance - In No Acute Distress] : in no acute distress [Oriented To Time, Place, And Person] : oriented to person, place, and time [Impaired Insight] : insight and judgment were intact [Affect] : the affect was normal [Person] : oriented to person [Place] : oriented to place [Time] : oriented to time [Concentration Intact] : normal concentrating ability [Visual Intact] : visual attention was ~T not ~L decreased [Naming Objects] : no difficulty naming common objects [Repeating Phrases] : no difficulty repeating a phrase [Writing A Sentence] : no difficulty writing a sentence [Fluency] : fluency intact [Comprehension] : comprehension intact [Reading] : reading intact [Past History] : adequate knowledge of personal past history [Cranial Nerves Optic (II)] : visual acuity intact bilaterally,  visual fields full to confrontation, pupils equal round and reactive to light [Cranial Nerves Oculomotor (III)] : extraocular motion intact [Cranial Nerves Trigeminal (V)] : facial sensation intact symmetrically [Cranial Nerves Facial (VII)] : face symmetrical [Cranial Nerves Vestibulocochlear (VIII)] : hearing was intact bilaterally [Cranial Nerves Glossopharyngeal (IX)] : tongue and palate midline [Cranial Nerves Accessory (XI - Cranial And Spinal)] : head turning and shoulder shrug symmetric [Cranial Nerves Hypoglossal (XII)] : there was no tongue deviation with protrusion [Motor Tone] : muscle tone was normal in all four extremities [Motor Strength] : muscle strength was normal in all four extremities [No Muscle Atrophy] : normal bulk in all four extremities [Motor Handedness Right-Handed] : the patient is right hand dominant [Paresis Pronator Drift Right-Sided] : no pronator drift on the right [Paresis Pronator Drift Left-Sided] : no pronator drift on the left [Sensation Tactile Decrease] : light touch was intact [Sensation Pain / Temperature Decrease] : pain and temperature was intact [Proprioception] : proprioception was intact [Balance] : balance was intact [Past-pointing] : there was no past-pointing [Tremor] : no tremor present [Dysdiadochokinesia Bilaterally] : not present [Coordination - Dysmetria Impaired Finger-to-Nose Bilateral] : not present [2+] : Patella left 2+ [1+] : Ankle jerk left 1+ [Plantar Reflex Right Only] : normal on the right [Plantar Reflex Left Only] : normal on the left [___] : absent on the right [___] : absent on the left [No Visual Abnormalities] : no visible abnormalities [No Tenderness to Palpation] : no spine tenderness on palpation [No Masses] : no masses [Full ROM] : full ROM [No Pain with ROM] : no pain with motion in any direction [Intact] : all reflexes within normal limits bilaterally [Abnormal Walk] : normal gait [Nail Clubbing] : no clubbing  or cyanosis of the fingernails [Musculoskeletal - Swelling] : no joint swelling seen [FreeTextEntry1] : pain on range of motion of the right hip

## 2022-03-11 ENCOUNTER — APPOINTMENT (OUTPATIENT)
Dept: MRI IMAGING | Facility: CLINIC | Age: 72
End: 2022-03-11

## 2022-03-14 ENCOUNTER — OUTPATIENT (OUTPATIENT)
Dept: OUTPATIENT SERVICES | Facility: HOSPITAL | Age: 72
LOS: 1 days | End: 2022-03-14

## 2022-03-14 DIAGNOSIS — Z98.890 OTHER SPECIFIED POSTPROCEDURAL STATES: Chronic | ICD-10-CM

## 2022-03-14 DIAGNOSIS — Z00.8 ENCOUNTER FOR OTHER GENERAL EXAMINATION: ICD-10-CM

## 2022-03-14 DIAGNOSIS — Z98.49 CATARACT EXTRACTION STATUS, UNSPECIFIED EYE: Chronic | ICD-10-CM

## 2022-03-16 ENCOUNTER — FORM ENCOUNTER (OUTPATIENT)
Age: 72
End: 2022-03-16

## 2022-03-16 ENCOUNTER — OUTPATIENT (OUTPATIENT)
Dept: OUTPATIENT SERVICES | Facility: HOSPITAL | Age: 72
LOS: 1 days | End: 2022-03-16

## 2022-03-16 DIAGNOSIS — Z98.890 OTHER SPECIFIED POSTPROCEDURAL STATES: Chronic | ICD-10-CM

## 2022-03-16 DIAGNOSIS — Z98.49 CATARACT EXTRACTION STATUS, UNSPECIFIED EYE: Chronic | ICD-10-CM

## 2022-03-16 DIAGNOSIS — Z00.8 ENCOUNTER FOR OTHER GENERAL EXAMINATION: ICD-10-CM

## 2022-03-18 ENCOUNTER — APPOINTMENT (OUTPATIENT)
Dept: ULTRASOUND IMAGING | Facility: CLINIC | Age: 72
End: 2022-03-18

## 2022-03-21 ENCOUNTER — RESULT REVIEW (OUTPATIENT)
Age: 72
End: 2022-03-21

## 2022-03-21 ENCOUNTER — OUTPATIENT (OUTPATIENT)
Dept: OUTPATIENT SERVICES | Facility: HOSPITAL | Age: 72
LOS: 1 days | End: 2022-03-21
Payer: COMMERCIAL

## 2022-03-21 ENCOUNTER — APPOINTMENT (OUTPATIENT)
Dept: MAMMOGRAPHY | Facility: CLINIC | Age: 72
End: 2022-03-21
Payer: COMMERCIAL

## 2022-03-21 ENCOUNTER — APPOINTMENT (OUTPATIENT)
Dept: RADIOLOGY | Facility: CLINIC | Age: 72
End: 2022-03-21
Payer: COMMERCIAL

## 2022-03-21 DIAGNOSIS — Z98.890 OTHER SPECIFIED POSTPROCEDURAL STATES: Chronic | ICD-10-CM

## 2022-03-21 DIAGNOSIS — M89.9 DISORDER OF BONE, UNSPECIFIED: ICD-10-CM

## 2022-03-21 DIAGNOSIS — Z12.31 ENCOUNTER FOR SCREENING MAMMOGRAM FOR MALIGNANT NEOPLASM OF BREAST: ICD-10-CM

## 2022-03-21 DIAGNOSIS — M94.9 DISORDER OF CARTILAGE, UNSPECIFIED: ICD-10-CM

## 2022-03-21 DIAGNOSIS — Z98.49 CATARACT EXTRACTION STATUS, UNSPECIFIED EYE: Chronic | ICD-10-CM

## 2022-03-21 PROCEDURE — 77063 BREAST TOMOSYNTHESIS BI: CPT | Mod: 26

## 2022-03-21 PROCEDURE — 77063 BREAST TOMOSYNTHESIS BI: CPT

## 2022-03-21 PROCEDURE — 77080 DXA BONE DENSITY AXIAL: CPT | Mod: 26

## 2022-03-21 PROCEDURE — 77067 SCR MAMMO BI INCL CAD: CPT | Mod: 26

## 2022-03-21 PROCEDURE — 77067 SCR MAMMO BI INCL CAD: CPT

## 2022-03-21 PROCEDURE — 77080 DXA BONE DENSITY AXIAL: CPT

## 2022-03-22 ENCOUNTER — NON-APPOINTMENT (OUTPATIENT)
Age: 72
End: 2022-03-22

## 2022-03-24 ENCOUNTER — NON-APPOINTMENT (OUTPATIENT)
Age: 72
End: 2022-03-24

## 2022-04-13 ENCOUNTER — APPOINTMENT (OUTPATIENT)
Dept: FAMILY MEDICINE | Facility: CLINIC | Age: 72
End: 2022-04-13
Payer: COMMERCIAL

## 2022-04-13 PROCEDURE — 36415 COLL VENOUS BLD VENIPUNCTURE: CPT

## 2022-04-14 LAB
ALBUMIN SERPL ELPH-MCNC: 4.8 G/DL
ALP BLD-CCNC: 82 U/L
ALT SERPL-CCNC: 20 U/L
ANION GAP SERPL CALC-SCNC: 13 MMOL/L
AST SERPL-CCNC: 23 U/L
BILIRUB SERPL-MCNC: 0.3 MG/DL
BUN SERPL-MCNC: 21 MG/DL
CALCIUM SERPL-MCNC: 10 MG/DL
CHLORIDE SERPL-SCNC: 104 MMOL/L
CHOLEST SERPL-MCNC: 215 MG/DL
CO2 SERPL-SCNC: 24 MMOL/L
CREAT SERPL-MCNC: 0.9 MG/DL
EGFR: 69 ML/MIN/1.73M2
GLUCOSE SERPL-MCNC: 84 MG/DL
HDLC SERPL-MCNC: 91 MG/DL
LDLC SERPL CALC-MCNC: 109 MG/DL
NONHDLC SERPL-MCNC: 125 MG/DL
POTASSIUM SERPL-SCNC: 4.6 MMOL/L
PROT SERPL-MCNC: 7.3 G/DL
SODIUM SERPL-SCNC: 141 MMOL/L
TRIGL SERPL-MCNC: 77 MG/DL

## 2022-04-20 ENCOUNTER — APPOINTMENT (OUTPATIENT)
Dept: FAMILY MEDICINE | Facility: CLINIC | Age: 72
End: 2022-04-20
Payer: COMMERCIAL

## 2022-04-20 VITALS
BODY MASS INDEX: 26.46 KG/M2 | OXYGEN SATURATION: 99 % | DIASTOLIC BLOOD PRESSURE: 78 MMHG | WEIGHT: 155 LBS | HEART RATE: 73 BPM | SYSTOLIC BLOOD PRESSURE: 120 MMHG | TEMPERATURE: 98 F | HEIGHT: 64 IN

## 2022-04-20 DIAGNOSIS — M54.10 RADICULOPATHY, SITE UNSPECIFIED: ICD-10-CM

## 2022-04-20 PROCEDURE — 99214 OFFICE O/P EST MOD 30 MIN: CPT

## 2022-04-20 NOTE — ASSESSMENT
[FreeTextEntry1] : CHINA WU is a 71 year old female here for follow up on medical issues as noted above.\par

## 2022-04-20 NOTE — PHYSICAL EXAM
[No Acute Distress] : no acute distress [Well Nourished] : well nourished [Well Developed] : well developed [Well-Appearing] : well-appearing [Normal Sclera/Conjunctiva] : normal sclera/conjunctiva [EOMI] : extraocular movements intact [Normal Outer Ear/Nose] : the outer ears and nose were normal in appearance [No JVD] : no jugular venous distention [No Lymphadenopathy] : no lymphadenopathy [Supple] : supple [Thyroid Normal, No Nodules] : the thyroid was normal and there were no nodules present [No Respiratory Distress] : no respiratory distress  [No Accessory Muscle Use] : no accessory muscle use [Clear to Auscultation] : lungs were clear to auscultation bilaterally [Normal Rate] : normal rate  [Regular Rhythm] : with a regular rhythm [Normal S1, S2] : normal S1 and S2 [No Murmur] : no murmur heard [No Carotid Bruits] : no carotid bruits [No Varicosities] : no varicosities [Pedal Pulses Present] : the pedal pulses are present [No Edema] : there was no peripheral edema [Soft] : abdomen soft [No Extremity Clubbing/Cyanosis] : no extremity clubbing/cyanosis [Non Tender] : non-tender [Non-distended] : non-distended [No Masses] : no abdominal mass palpated [No HSM] : no HSM [Normal Bowel Sounds] : normal bowel sounds [Normal Posterior Cervical Nodes] : no posterior cervical lymphadenopathy [Normal Anterior Cervical Nodes] : no anterior cervical lymphadenopathy [No Joint Swelling] : no joint swelling [Grossly Normal Strength/Tone] : grossly normal strength/tone [No Rash] : no rash [No Focal Deficits] : no focal deficits [Coordination Grossly Intact] : coordination grossly intact [Normal Gait] : normal gait [Deep Tendon Reflexes (DTR)] : deep tendon reflexes were 2+ and symmetric [Normal Affect] : the affect was normal [Normal Insight/Judgement] : insight and judgment were intact

## 2022-04-20 NOTE — REVIEW OF SYSTEMS
[Constipation] : constipation [Heartburn] : heartburn [Joint Pain] : joint pain [Joint Stiffness] : joint stiffness [Depression] : depression [Negative] : Heme/Lymph [Abdominal Pain] : no abdominal pain [Diarrhea] : diarrhea [Vomiting] : no vomiting [Melena] : no melena [Headache] : no headache [Dizziness] : no dizziness [Memory Loss] : no memory loss [Unsteady Walking] : no ataxia [Suicidal] : not suicidal [Insomnia] : no insomnia [Anxiety] : no anxiety [FreeTextEntry7] : +GERD, well controlled on daily PPI med, also see HPI [FreeTextEntry9] : OA related joint stiffness and pain that she manages with keeping physically active  [de-identified] : R leg numbness from spinal nerve root impingement L/S spine, see HPI,  [de-identified] : h/o depression, feeling well on low dose sertraline and wishes to cont same for now as still higher stress in life but might like to wean off med in near future once stress settles a bit

## 2022-04-20 NOTE — HISTORY OF PRESENT ILLNESS
[FreeTextEntry1] : CHINA WU is a 71 year old female here for a follow up visit.\par  [de-identified] : Zonia also has a h/o hypercholesterolemia, thyroid nodule, GERD, osteopenia, OA (back/hip w/ R leg pain/neuropathy sxs) and depression. \par \par She is taking meds consistently and tolerating them well. \par \par Had card eval in 2019 and all was wnl in the end (failed ETT so had to do nuclear stress test that was wnl, carotid US showed mild plaque, coronary CT score was only 9). Dr. Kelly recommended statin med based on her LDL level and she is tolerating it well\par \par Had EGD 2019 and showed HH and otherwise was wnl. 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 She is UTD on f/u with Dr Cobos.  Is eating high fiber and hydrating well.  She has found that bowels are doing better at this point and she never needed to add the Magnesium in and actually only needs Miralax occasionally at this point. \par \par Last thyroid US was 9/2021 and all is stable since 12/2016. We disc option to cont annual US vs. d/c further US and she prefers to cont to screen annually to keep an eye on nodule/calcifications\par \par Has had more right leg pain and numbness lionel with prolonged sitting (lionel in car). Seen at [facility]. Had MRI L/S spine which shows some pinched nerve roots. Had Meloxicam but caused nausea and facial flushing so stopped. Now taking Celebrex w/ food prn and helps (limits use of med to only when really needed). \par \par She saw Dr. Nguyễn 3/2022 for neuro eval of the R leg pain/numbness as MRI showed nerve compression. He started her on gabapentin and ordered MRI and EMG/NCS for further eval of her sxs. She could not get the EMG/NCS as soon as she wanted to saw a new neurologist in [place] (Dr. Baker) and EMG/NCS was wnl. PT was recommended and she has started this at [facility] for her back/hip pain and this is helping. Also had a fall. Has Gabapentin that she uses prn (lately using QHS due to foot pain from toe fracture, see below) and this is helpful but makes her groggy first thing in AM \par \par Also broke R third toe dropping heavy item onto booted foot and saw podiatrist Dr. Arrieta and she recommended a high boot which she wears as many hrs per day as she can tolerate but it bothers her back due to altered gait and heaviness of boot so she can not use all day long\par \par She had COVID infection in 1/2022 and she has recovered from this (were mild sxs overall). Has had 3 doses of vaccine.

## 2022-04-20 NOTE — PLAN
[FreeTextEntry1] : Revd recent labs done prior to visit. CMP is wnl. Lipids are wnl/fine although I see that HDL has dropped a bit (was 102 and is now 91) although it is still excellent, and LDL has crept up from 99 to 109 but is still fine. Cont Rosuvastatin 5 mg daily and tweak eating/exercise patterns if possible to eat as cleanly as possible and exercise regularly. She has been a bit off track with exercise and clean eating these past few months due to 's prostate cancer surgery and her own back/leg/fot pain issues but with better weather arriving now she thinks she will be able to better focus on her own care/clean eating/exercise. \par \par Cont same meds/doses as she is currently taking. \par \par Revd clean eating (eg Mediterranean style eating plan) and regular exercise/staying as physically active as possible. Work on core strengthening and stretching/yoga/pilates exercises for low back/hips etc. Concept of motion is lotion revd. \par \par F/u with ortho ([facility]) and neuro (Dr. Nguyễn) for continue eval/treatment of R hip/low back and RLE pain and neuropathy sxs. Cont periodic f/u with Dr. Cobos as recommended by him. \par \par Will try lower dose of gabapentin 100-200 mg to see if helps with neuropathic pain with less grogginess in AM. Ok to cont to use prn \par \par Reviewed importance of good self care (eg meditation, yoga, adequate rest, regular exercise, magnesium, clean eating etc).\par \par She is willing to cont same dose sertraline for now and does not feel needs higher dose despite life stress as stress is starting to improve and she will incr exercise levels to help with stress. Down the road she indicates she might want to try weaning off sertraline and she can let me know if/when she is ready to do this. \par \par Next CPE and RPA chol etc f/u visit and fasting labs in 6 mos.\par

## 2022-05-27 ENCOUNTER — APPOINTMENT (OUTPATIENT)
Dept: NEUROLOGY | Facility: CLINIC | Age: 72
End: 2022-05-27

## 2022-05-31 ENCOUNTER — NON-APPOINTMENT (OUTPATIENT)
Age: 72
End: 2022-05-31

## 2022-06-08 ENCOUNTER — APPOINTMENT (OUTPATIENT)
Dept: CT IMAGING | Facility: CLINIC | Age: 72
End: 2022-06-08

## 2022-06-10 ENCOUNTER — APPOINTMENT (OUTPATIENT)
Dept: CT IMAGING | Facility: CLINIC | Age: 72
End: 2022-06-10
Payer: COMMERCIAL

## 2022-06-10 ENCOUNTER — OUTPATIENT (OUTPATIENT)
Dept: OUTPATIENT SERVICES | Facility: HOSPITAL | Age: 72
LOS: 1 days | End: 2022-06-10
Payer: COMMERCIAL

## 2022-06-10 DIAGNOSIS — Z98.49 CATARACT EXTRACTION STATUS, UNSPECIFIED EYE: Chronic | ICD-10-CM

## 2022-06-10 DIAGNOSIS — Z00.8 ENCOUNTER FOR OTHER GENERAL EXAMINATION: ICD-10-CM

## 2022-06-10 DIAGNOSIS — Z98.890 OTHER SPECIFIED POSTPROCEDURAL STATES: Chronic | ICD-10-CM

## 2022-06-10 PROCEDURE — 76376 3D RENDER W/INTRP POSTPROCES: CPT

## 2022-06-10 PROCEDURE — 73700 CT LOWER EXTREMITY W/O DYE: CPT

## 2022-06-10 PROCEDURE — 76376 3D RENDER W/INTRP POSTPROCES: CPT | Mod: 26

## 2022-06-10 PROCEDURE — 73700 CT LOWER EXTREMITY W/O DYE: CPT | Mod: 26,RT

## 2022-06-29 ENCOUNTER — RX RENEWAL (OUTPATIENT)
Age: 72
End: 2022-06-29

## 2022-09-02 NOTE — H&P PST ADULT - PRO PAIN EXPRESSION
Has quit for 2 weeks, was smoking 10 cigarettes per day. He states he's had a few slips and just smoked a little. Discussed getting 2 mg Lozenges. He's never quit before. He is highly motivated to quit. FTND of 2 indicates a low level of tobacco/nicotine dependency; CESD of 1 is preceived as no mental distress or depression at this time.      Quality 224: Stage 0-Iic Melanoma: Overutilization Of Imaging Studies For Only Stage 0-Iic Melanoma: None of the following diagnostic imaging studies ordered: chest X-ray, CT, Ultrasound, MRI, PET, or nuclear medicine scans (ML) Quality 137: Melanoma: Continuity Of Care - Recall System: Patient information entered into a recall system that includes: target date for the next exam specified AND a process to follow up with patients regarding missed or unscheduled appointments Detail Level: Detailed Detail Level: Simple Detail Level: Generalized verbalization

## 2022-09-14 ENCOUNTER — FORM ENCOUNTER (OUTPATIENT)
Age: 72
End: 2022-09-14

## 2022-09-28 ENCOUNTER — NON-APPOINTMENT (OUTPATIENT)
Age: 72
End: 2022-09-28

## 2022-10-25 ENCOUNTER — APPOINTMENT (OUTPATIENT)
Dept: FAMILY MEDICINE | Facility: CLINIC | Age: 72
End: 2022-10-25

## 2022-10-25 PROCEDURE — 36415 COLL VENOUS BLD VENIPUNCTURE: CPT

## 2022-10-27 LAB
ALBUMIN SERPL ELPH-MCNC: 4.8 G/DL
ALP BLD-CCNC: 86 U/L
ALT SERPL-CCNC: 25 U/L
ANION GAP SERPL CALC-SCNC: 13 MMOL/L
AST SERPL-CCNC: 27 U/L
BILIRUB SERPL-MCNC: 0.4 MG/DL
BUN SERPL-MCNC: 18 MG/DL
CALCIUM SERPL-MCNC: 9.9 MG/DL
CHLORIDE SERPL-SCNC: 102 MMOL/L
CHOLEST SERPL-MCNC: 233 MG/DL
CO2 SERPL-SCNC: 26 MMOL/L
CREAT SERPL-MCNC: 0.85 MG/DL
EGFR: 73 ML/MIN/1.73M2
GLUCOSE SERPL-MCNC: 85 MG/DL
HDLC SERPL-MCNC: 97 MG/DL
LDLC SERPL CALC-MCNC: 120 MG/DL
NONHDLC SERPL-MCNC: 136 MG/DL
POTASSIUM SERPL-SCNC: 4.6 MMOL/L
PROT SERPL-MCNC: 7.2 G/DL
SODIUM SERPL-SCNC: 140 MMOL/L
TRIGL SERPL-MCNC: 77 MG/DL
TSH SERPL-ACNC: 2.04 UIU/ML
VIT B12 SERPL-MCNC: 403 PG/ML

## 2022-10-31 ENCOUNTER — RESULT CHARGE (OUTPATIENT)
Age: 72
End: 2022-10-31

## 2022-11-01 ENCOUNTER — APPOINTMENT (OUTPATIENT)
Dept: FAMILY MEDICINE | Facility: CLINIC | Age: 72
End: 2022-11-01

## 2022-11-01 ENCOUNTER — NON-APPOINTMENT (OUTPATIENT)
Age: 72
End: 2022-11-01

## 2022-11-01 VITALS
HEIGHT: 64 IN | BODY MASS INDEX: 26.98 KG/M2 | HEART RATE: 71 BPM | OXYGEN SATURATION: 98 % | SYSTOLIC BLOOD PRESSURE: 124 MMHG | TEMPERATURE: 97.3 F | WEIGHT: 158 LBS | DIASTOLIC BLOOD PRESSURE: 72 MMHG

## 2022-11-01 DIAGNOSIS — E53.8 DEFICIENCY OF OTHER SPECIFIED B GROUP VITAMINS: ICD-10-CM

## 2022-11-01 PROCEDURE — G0008: CPT

## 2022-11-01 PROCEDURE — 93000 ELECTROCARDIOGRAM COMPLETE: CPT

## 2022-11-01 PROCEDURE — 90686 IIV4 VACC NO PRSV 0.5 ML IM: CPT

## 2022-11-01 PROCEDURE — 99397 PER PM REEVAL EST PAT 65+ YR: CPT | Mod: 25

## 2022-11-01 RX ORDER — CEPHALEXIN 500 MG/1
500 CAPSULE ORAL
Qty: 21 | Refills: 0 | Status: DISCONTINUED | COMMUNITY
Start: 2022-06-01

## 2022-11-01 NOTE — HISTORY OF PRESENT ILLNESS
[de-identified] : \par Her last physical exam was last year \par \par Vaccines: \par Tetanus is up to date; last Tdap 6/30/2016 \par Pneumococcal vaccination is up to date \par Shingrix is up to date\par COVID vaccine is up to date \par  \par Her last dentist visit was less than one year ago \par Her last eye doctor appointment was less than one year ago \par Her last dermatologist visit was less than one year ago, Dr. Brennan\par  \par GYN visit is up to date. Her last gyn exam was 3/2022 [facility]va [facility] at [facility] g[facility]-- also had eval with Dr. Thacker about pelvic organ prolapse and this is still mild enough that is not causing her signif sxs so is being monitored for now without surgical intervention.\par Mammogram is up to date ; last done 3/21/2022\par Colon cancer screening is up to date. Her last colonoscopy was 6/2021 well healed surgical site s/p partial colectomy (see below) , rpt due 5 yrs, Dr. Cobos\par DEXA is up to date ; last done 03/21/2022 osteopenia\par \par Her diet is healthy overall\par Exercise: exercises regularly usually but over past year has been less physically active due to various ortho sxs/issues. We disc trying to get back to the [facility] and start slowly and gently with lower impact types of exercise and increase gradually over time as able/tolerated\par \par Zonia has hypercholesterolemia, thyroid nodule, GERD, osteopenia, OA and h/o depression. \par \par She is taking meds consistently and tolerating them well. \par \par UTD on card eval and testing with Dr. Kelly in 2019 and all was wnl in the end ( nuclear stress test that was wnl, carotid US showed mild plaque, coronary CT score was only 9). Dr. Kelly recommended statin med based on her LDL level and she is tolerating it well\par \par Had EGD 2019 and showed HH and otherwise was wnl. 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 She is UTD on f/u with Dr Cobos. Is eating high fiber and hydrating well. She has found that bowels are doing better at this point and she only needs Miralax occasionally at this point. \par \par Last thyroid US was 9/2021 and all is stable since 12/2016. We disc option to cont annual US vs. d/c further US and she prefers to cont to screen annually to keep an eye on nodule/calcifications\par \par Had L toe Fx that has mostly healed but not completely healed on imaging. Not causing her pain so may be her new normal. Was measured for orthotics but when she started using them she started to have L knee pain. Had podiatry eval with a new specialist who felt orthotics might be the source of her knee pain (through altered gait). Had second set of orthotics made and is awaiting these so can use. Saw Dr. Dalton and he ordered MRI L knee due to she had some give way weakness and had this a few days ago and is awaiting results. . \par \par Had ortho eval, neuro eval, EMG and no cause for her sxs or cure found for her various lower back and leg pains and neuropathy type sxs. Saw chiro and had decompression procedure x 10 and then another round of 5 and her pain and neuropathic sxs are resolved. Now will do maintenance treatment Q3-4 weeks. Ortho gave Celebrex Rx for OA pain and this helps but she knows we discussed that NSAIDs are not a great idea for her to use regularly \par \par Gets rash/peeling corners of mouth and most recently affected her lower lip and not just corners of mouth. Saw Derm (Dr. brennan) and was rx'ed mupirocin which seems to be helping whereas Aquaphor does not

## 2022-11-01 NOTE — PHYSICAL EXAM

## 2022-11-01 NOTE — PLAN
[FreeTextEntry1] : Labs done prior to visit reviewed with Zonia today. Vit B12 is low ideal range at 403 and she has been having neuropathy type sxs (see prior notes for details) as well as angular chelitis sxs so I recommend she either significantly increase her dietary vit B12 intake, or add vit B complex or vit B12 500 mcg daily or 1000 mcg 3-4 times a week to boost B12 levels. LDL has again increased (from 99, to 109, to now 120) on Rosuvastatin 5 mg daily so I recommend she increase Rosuvastatin dose to 10 mg daily and she is willing to do this. And she should cont/incr efforts to eat cleanly and be as physically active as possible.\par \par Reviewed age-appropriate preventive screening tests with patient. UTD on mammogram and DEXA and CRC screening and gyn exam.\par \par Flu vacc given with her consent (prefers reg strength flu vacc)\par \par Cont same meds/doses as she is currently taking except as noted above. \par \par ECG low voltage and stable from prior. LDL goal <=100 ideally.  Reviewed risks/benefits of statin med. Recommended excellent hydration +/- co Q10 (100-400 mg daily) to decrease risk for statin related myalgias. \par  \par Revd clean eating (eg Mediterranean style eating plan) and regular exercise/staying as physically active as possible. Work on core strengthening and stretching/yoga/pilates exercises for low back/hips etc. Concept of motion is lotion revd. \par \par F/u with ortho +/- neuro (Dr. Nguyễn) for continue eval/treatment of hip/low back/knee and RLE pain and neuropathy sxs. Cont periodic f/u with Dr. Cobos as recommended by him. \par \par Recommended Tylenol XS or Arthritis 1-2 pills BID-TID if helpful, ok to use NSAIDs sparingly with food (but revd r/b/se of NSAIDs incl CV, renal and GI and she should limit use as much as possible; she request RF Celebrex given by ortho originally to keep on hand for occas use but agrees she will reserve use to rare/occas only), regular stretching, heat/ice prn, consider turmeric supplementation, consider CBD cream or oral options, gentle yoga/chair yoga, Pilates, strengthen core muscles, consider chiro and/or massage and/or acupuncture. If these measures are not helpful enough then consider consultation with ortho and/or pain  for further eval and treatment. \par \par Reviewed importance of good self care (eg meditation, yoga, adequate rest, regular exercise, magnesium, clean eating etc).\par \par Continue same dose sertraline daily as is effective and well tolerated\par \par Follow up for next physical in one year. RPA chol etc f/u visit and fasting labs in 6 mos.

## 2022-11-01 NOTE — ASSESSMENT
[FreeTextEntry1] : CHINA WU is a 72 year old female here for a physical exam.  She is also here to follow up on medical issues as noted above.\par \par Patient has a history of hypercholesterolemia, depressive disorder, osteoarthritis, osteopenia, OA, thyroid nodules and vitamin B12 insufficiency

## 2022-11-01 NOTE — HEALTH RISK ASSESSMENT
[No falls in past year] : Patient reported no falls in the past year [0] : 2) Feeling down, depressed, or hopeless: Not at all (0) [PHQ-2 Negative - No further assessment needed] : PHQ-2 Negative - No further assessment needed [[id number]] : 0

## 2022-11-01 NOTE — REVIEW OF SYSTEMS
[Constipation] : constipation [Heartburn] : heartburn [Joint Pain] : joint pain [Joint Stiffness] : joint stiffness [Depression] : depression [Negative] : Heme/Lymph [Abdominal Pain] : no abdominal pain [Diarrhea] : diarrhea [Vomiting] : no vomiting [Melena] : no melena [Headache] : no headache [Dizziness] : no dizziness [Memory Loss] : no memory loss [Unsteady Walking] : no ataxia [Suicidal] : not suicidal [Insomnia] : no insomnia [Anxiety] : no anxiety [FreeTextEntry4] : +angular chelitis intermittently, saw derm and given Rx for mupirocin which seems to help, also will start B complex or B12 suppl now [FreeTextEntry7] : +GERD, well controlled on daily PPI med, also see HPI [FreeTextEntry9] : OA related joint stiffness and pain that she manages with keeping physically active  [de-identified] : Leg numbness has resolved s/p decompression spine treatments with chiro, see HPI [de-identified] : h/o depression, feeling well on low dose sertraline and wishes to cont same for now

## 2023-02-17 ENCOUNTER — FORM ENCOUNTER (OUTPATIENT)
Age: 73
End: 2023-02-17

## 2023-02-17 ENCOUNTER — NON-APPOINTMENT (OUTPATIENT)
Age: 73
End: 2023-02-17

## 2023-03-08 ENCOUNTER — FORM ENCOUNTER (OUTPATIENT)
Age: 73
End: 2023-03-08

## 2023-03-22 ENCOUNTER — APPOINTMENT (OUTPATIENT)
Dept: OBGYN | Facility: CLINIC | Age: 73
End: 2023-03-22
Payer: COMMERCIAL

## 2023-03-22 VITALS
DIASTOLIC BLOOD PRESSURE: 70 MMHG | SYSTOLIC BLOOD PRESSURE: 130 MMHG | HEART RATE: 68 BPM | HEIGHT: 64 IN | TEMPERATURE: 97.2 F | RESPIRATION RATE: 16 BRPM | BODY MASS INDEX: 28.17 KG/M2 | WEIGHT: 165 LBS

## 2023-03-22 DIAGNOSIS — R14.0 ABDOMINAL DISTENSION (GASEOUS): ICD-10-CM

## 2023-03-22 DIAGNOSIS — Z01.411 ENCOUNTER FOR GYNECOLOGICAL EXAMINATION (GENERAL) (ROUTINE) WITH ABNORMAL FINDINGS: ICD-10-CM

## 2023-03-22 PROCEDURE — 99387 INIT PM E/M NEW PAT 65+ YRS: CPT

## 2023-03-22 NOTE — DISCUSSION/SUMMARY
[FreeTextEntry1] : 1) pap performed\par 2) pt up to date with DEXA\par 3) rx for TV sono for further eval of bloating\par 4) f/u for screening mammo with rx from PCP\par \par REturn to office in one year,sooner prn

## 2023-03-22 NOTE — HISTORY OF PRESENT ILLNESS
[Patient reported bone density results were abnormal] : Patient reported bone density results were abnormal [Currently Active] : currently active [Men] : men [TextBox_4] : Zonia is a 71 y/o  who presents today for an annual exam\par \par she has a history of prolapsed bladder and sees uro/gyn q/6 months. she is asymptomatic.\par \par She is due for a mammogram and has an Rx from her PCP\par \par She denies a history of abnormal paps and has been with same partner for approx 40 years.  She still prefers to have a pap today\par \par Pt has c/o bloating today. [Mammogramdate] : 3/21/22 [BoneDensityDate] : 3/21/22 [TextBox_37] : osteopenia [TextBox_43] : up to date

## 2023-03-22 NOTE — PHYSICAL EXAM
[Appropriately responsive] : appropriately responsive [Alert] : alert [No Acute Distress] : no acute distress [No Lymphadenopathy] : no lymphadenopathy [Oriented x3] : oriented x3 [Examination Of The Breasts] : a normal appearance [No Discharge] : no discharge [No Masses] : no breast masses were palpable [Labia Majora] : normal [Uterine Prolapse] : uterine prolapse [Normal] : normal [Uterine Adnexae] : normal [FreeTextEntry4] : mild/moderate prolapse

## 2023-03-23 ENCOUNTER — RESULT REVIEW (OUTPATIENT)
Age: 73
End: 2023-03-23

## 2023-03-23 ENCOUNTER — OUTPATIENT (OUTPATIENT)
Dept: OUTPATIENT SERVICES | Facility: HOSPITAL | Age: 73
LOS: 1 days | End: 2023-03-23
Payer: COMMERCIAL

## 2023-03-23 ENCOUNTER — APPOINTMENT (OUTPATIENT)
Dept: MAMMOGRAPHY | Facility: CLINIC | Age: 73
End: 2023-03-23
Payer: COMMERCIAL

## 2023-03-23 DIAGNOSIS — Z98.890 OTHER SPECIFIED POSTPROCEDURAL STATES: Chronic | ICD-10-CM

## 2023-03-23 DIAGNOSIS — Z00.00 ENCOUNTER FOR GENERAL ADULT MEDICAL EXAMINATION WITHOUT ABNORMAL FINDINGS: ICD-10-CM

## 2023-03-23 DIAGNOSIS — Z98.49 CATARACT EXTRACTION STATUS, UNSPECIFIED EYE: Chronic | ICD-10-CM

## 2023-03-23 LAB — HPV HIGH+LOW RISK DNA PNL CVX: NOT DETECTED

## 2023-03-23 PROCEDURE — 77063 BREAST TOMOSYNTHESIS BI: CPT | Mod: 26

## 2023-03-23 PROCEDURE — 77063 BREAST TOMOSYNTHESIS BI: CPT

## 2023-03-23 PROCEDURE — 77067 SCR MAMMO BI INCL CAD: CPT | Mod: 26

## 2023-03-23 PROCEDURE — 77067 SCR MAMMO BI INCL CAD: CPT

## 2023-03-24 ENCOUNTER — NON-APPOINTMENT (OUTPATIENT)
Age: 73
End: 2023-03-24

## 2023-03-27 LAB — CYTOLOGY CVX/VAG DOC THIN PREP: ABNORMAL

## 2023-04-03 ENCOUNTER — OUTPATIENT (OUTPATIENT)
Dept: OUTPATIENT SERVICES | Facility: HOSPITAL | Age: 73
LOS: 1 days | End: 2023-04-03
Payer: COMMERCIAL

## 2023-04-03 ENCOUNTER — APPOINTMENT (OUTPATIENT)
Dept: ULTRASOUND IMAGING | Facility: CLINIC | Age: 73
End: 2023-04-03
Payer: COMMERCIAL

## 2023-04-03 ENCOUNTER — RESULT REVIEW (OUTPATIENT)
Age: 73
End: 2023-04-03

## 2023-04-03 DIAGNOSIS — Z98.890 OTHER SPECIFIED POSTPROCEDURAL STATES: Chronic | ICD-10-CM

## 2023-04-03 DIAGNOSIS — Z98.49 CATARACT EXTRACTION STATUS, UNSPECIFIED EYE: Chronic | ICD-10-CM

## 2023-04-03 DIAGNOSIS — R14.0 ABDOMINAL DISTENSION (GASEOUS): ICD-10-CM

## 2023-04-03 PROCEDURE — 76830 TRANSVAGINAL US NON-OB: CPT | Mod: 26

## 2023-04-03 PROCEDURE — 76856 US EXAM PELVIC COMPLETE: CPT | Mod: 26

## 2023-04-03 PROCEDURE — 76856 US EXAM PELVIC COMPLETE: CPT

## 2023-04-03 PROCEDURE — 76830 TRANSVAGINAL US NON-OB: CPT

## 2023-04-04 ENCOUNTER — NON-APPOINTMENT (OUTPATIENT)
Age: 73
End: 2023-04-04

## 2023-04-12 ENCOUNTER — TRANSCRIPTION ENCOUNTER (OUTPATIENT)
Age: 73
End: 2023-04-12

## 2023-04-12 LAB
EER MALIGNANCY ASSESSMENT, OVA1 PLUS: NORMAL
MALIGNANCY ASSESSMENT, CA 125 II: 18
MALIGNANCY ASSESSMENT, MENOPAUSAL STATUS: NORMAL
MALIGNANCY ASSESSMENT, OVA1 SCORE: 5.1
MALIGNANCY ASSESSMENT, OVERA SCORE: 4.3

## 2023-04-13 ENCOUNTER — APPOINTMENT (OUTPATIENT)
Dept: MRI IMAGING | Facility: CLINIC | Age: 73
End: 2023-04-13
Payer: COMMERCIAL

## 2023-04-13 ENCOUNTER — OUTPATIENT (OUTPATIENT)
Dept: OUTPATIENT SERVICES | Facility: HOSPITAL | Age: 73
LOS: 1 days | End: 2023-04-13
Payer: COMMERCIAL

## 2023-04-13 DIAGNOSIS — Z98.890 OTHER SPECIFIED POSTPROCEDURAL STATES: Chronic | ICD-10-CM

## 2023-04-13 DIAGNOSIS — Z98.49 CATARACT EXTRACTION STATUS, UNSPECIFIED EYE: Chronic | ICD-10-CM

## 2023-04-13 DIAGNOSIS — N83.8 OTHER NONINFLAMMATORY DISORDERS OF OVARY, FALLOPIAN TUBE AND BROAD LIGAMENT: ICD-10-CM

## 2023-04-13 PROCEDURE — 72197 MRI PELVIS W/O & W/DYE: CPT | Mod: 26

## 2023-04-13 PROCEDURE — A9585: CPT

## 2023-04-13 PROCEDURE — 72197 MRI PELVIS W/O & W/DYE: CPT

## 2023-04-17 ENCOUNTER — APPOINTMENT (OUTPATIENT)
Dept: OBGYN | Facility: CLINIC | Age: 73
End: 2023-04-17
Payer: COMMERCIAL

## 2023-04-17 PROCEDURE — 99214 OFFICE O/P EST MOD 30 MIN: CPT

## 2023-04-17 NOTE — HISTORY OF PRESENT ILLNESS
[FreeTextEntry1] : Patient is a 72-year-old female who presents for consultation to discuss her recent test results and treatment options.  Patient was seen for routine annual exam patient is asymptomatic and requested a transvaginal ultrasound for screening.  No gynecologic indication at this time.  Patient was found on ultrasound to have a 1.5 cm hyperechoic left ovarian cyst.  Patient had an oval 1+ which revealed a result of 5.1 which is slightly elevated however the  component was 18 which is normal.  Patient had a recent pelvic MRI last week results are pending.  Patient produced a pelvic MRI from 2002 at which time a 0.8 cm left ovarian cyst was noted.  This present left ovarian cyst may be the same cyst previously seen in 2002 just slightly larger.  Discussed these findings with the patient in detail none of the results are particularly suspicious and this cyst is likely been present for a long period.  In view of such pending the results of the MRI advised follow-up pelvic sonogram and Ca1 25 in 3 months.  If the pelvic MRI is suspicious we will then recommend surgical intervention.  Risk benefits and alternatives discussed with patient and  questions answered.  Patient states she understands and is agreeable to plan of care.\par \par 30 minutes of face time

## 2023-04-19 ENCOUNTER — APPOINTMENT (OUTPATIENT)
Dept: FAMILY MEDICINE | Facility: CLINIC | Age: 73
End: 2023-04-19
Payer: COMMERCIAL

## 2023-04-19 PROCEDURE — 36415 COLL VENOUS BLD VENIPUNCTURE: CPT

## 2023-04-22 LAB
ALBUMIN SERPL ELPH-MCNC: 4.5 G/DL
ALP BLD-CCNC: 75 U/L
ALT SERPL-CCNC: 16 U/L
ANION GAP SERPL CALC-SCNC: 12 MMOL/L
AST SERPL-CCNC: 21 U/L
BASOPHILS # BLD AUTO: 0.05 K/UL
BASOPHILS NFR BLD AUTO: 1.1 %
BILIRUB SERPL-MCNC: 0.4 MG/DL
BUN SERPL-MCNC: 16 MG/DL
CALCIUM SERPL-MCNC: 9.7 MG/DL
CHLORIDE SERPL-SCNC: 103 MMOL/L
CHOLEST SERPL-MCNC: 215 MG/DL
CO2 SERPL-SCNC: 26 MMOL/L
CREAT SERPL-MCNC: 0.84 MG/DL
EGFR: 74 ML/MIN/1.73M2
EOSINOPHIL # BLD AUTO: 0.1 K/UL
EOSINOPHIL NFR BLD AUTO: 2.1 %
GLUCOSE SERPL-MCNC: 85 MG/DL
HCT VFR BLD CALC: 43 %
HDLC SERPL-MCNC: 105 MG/DL
HGB BLD-MCNC: 13.5 G/DL
IMM GRANULOCYTES NFR BLD AUTO: 0.4 %
LDLC SERPL CALC-MCNC: 99 MG/DL
LYMPHOCYTES # BLD AUTO: 0.68 K/UL
LYMPHOCYTES NFR BLD AUTO: 14.3 %
MAN DIFF?: NORMAL
MCHC RBC-ENTMCNC: 29 PG
MCHC RBC-ENTMCNC: 31.4 GM/DL
MCV RBC AUTO: 92.5 FL
MONOCYTES # BLD AUTO: 0.4 K/UL
MONOCYTES NFR BLD AUTO: 8.4 %
NEUTROPHILS # BLD AUTO: 3.51 K/UL
NEUTROPHILS NFR BLD AUTO: 73.7 %
NONHDLC SERPL-MCNC: 110 MG/DL
PLATELET # BLD AUTO: 340 K/UL
POTASSIUM SERPL-SCNC: 4.6 MMOL/L
PROT SERPL-MCNC: 6.7 G/DL
RBC # BLD: 4.65 M/UL
RBC # FLD: 13 %
SODIUM SERPL-SCNC: 141 MMOL/L
TRIGL SERPL-MCNC: 56 MG/DL
TSH SERPL-ACNC: 2.27 UIU/ML
VIT B12 SERPL-MCNC: 804 PG/ML
WBC # FLD AUTO: 4.76 K/UL

## 2023-04-24 ENCOUNTER — NON-APPOINTMENT (OUTPATIENT)
Age: 73
End: 2023-04-24

## 2023-04-24 ENCOUNTER — APPOINTMENT (OUTPATIENT)
Dept: FAMILY MEDICINE | Facility: CLINIC | Age: 73
End: 2023-04-24
Payer: COMMERCIAL

## 2023-04-24 VITALS
BODY MASS INDEX: 28 KG/M2 | WEIGHT: 164 LBS | HEART RATE: 80 BPM | SYSTOLIC BLOOD PRESSURE: 120 MMHG | HEIGHT: 64 IN | TEMPERATURE: 98.5 F | DIASTOLIC BLOOD PRESSURE: 74 MMHG | OXYGEN SATURATION: 98 %

## 2023-04-24 DIAGNOSIS — K21.9 GASTRO-ESOPHAGEAL REFLUX DISEASE W/OUT ESOPHAGITIS: ICD-10-CM

## 2023-04-24 PROCEDURE — 99215 OFFICE O/P EST HI 40 MIN: CPT

## 2023-04-24 RX ORDER — LACTOBACILLUS RHAMNOSUS GG 10B CELL
CAPSULE ORAL
Refills: 0 | Status: DISCONTINUED | COMMUNITY
End: 2023-04-24

## 2023-04-24 RX ORDER — GABAPENTIN 100 MG/1
100 CAPSULE ORAL
Qty: 180 | Refills: 2 | Status: DISCONTINUED | COMMUNITY
Start: 2022-04-20 | End: 2023-04-24

## 2023-04-24 RX ORDER — ROSUVASTATIN CALCIUM 5 MG/1
5 TABLET, FILM COATED ORAL
Qty: 90 | Refills: 3 | Status: DISCONTINUED | COMMUNITY
End: 2023-04-24

## 2023-04-24 RX ORDER — MUPIROCIN 20 MG/G
2 OINTMENT TOPICAL
Qty: 22 | Refills: 0 | Status: DISCONTINUED | COMMUNITY
Start: 2022-09-22 | End: 2023-04-24

## 2023-04-24 RX ORDER — CELECOXIB 100 MG/1
100 CAPSULE ORAL
Qty: 60 | Refills: 0 | Status: DISCONTINUED | COMMUNITY
Start: 2022-11-01 | End: 2023-04-24

## 2023-04-24 RX ORDER — TRIAMCINOLONE ACETONIDE 1 MG/G
0.1 CREAM TOPICAL
Qty: 80 | Refills: 0 | Status: DISCONTINUED | COMMUNITY
Start: 2022-09-22 | End: 2023-04-24

## 2023-04-24 RX ORDER — DIPHENHYDRAMINE HYDROCHLORIDE, ZINC ACETATE 20; 1 MG/G; MG/G
CREAM TOPICAL
Refills: 0 | Status: DISCONTINUED | COMMUNITY
End: 2023-04-24

## 2023-04-24 RX ORDER — GABAPENTIN 300 MG/1
300 CAPSULE ORAL TWICE DAILY
Qty: 60 | Refills: 5 | Status: DISCONTINUED | COMMUNITY
Start: 2022-03-02 | End: 2023-04-24

## 2023-04-24 RX ORDER — CHROMIUM 200 MCG
TABLET ORAL
Refills: 0 | Status: DISCONTINUED | COMMUNITY
End: 2023-04-24

## 2023-04-24 NOTE — DIETITIAN INITIAL EVALUATION ADULT. - EDUCATION DIETARY MODIFICATIONS
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 Found to have bacteremia with strep mitis. Pending improvement of ANC.     Onc:  - Cycle 4 of ICE  - Day 27  - Will plan for imaging May 1 to evaluate disease, or sooner if symptoms develop     Heme: Pancytopenia secondary to chemotherapy   - TF 7/30 due to iron overload  - S/P neulasta on 4/4  - PRBCs 4/21 for 7.1 and symptoms   - PLTs given 4/22 for PLT count 21    ID: Fever and neutropenia  - BCx from 4/8, 4/9 neg and 4/10 NGTD  - BCx positive (4/7) for strepmitis   - Cefepime for 7 days + 3 non neutropenic days (4/8 - )  - Cefepime locks d/c'ed due to need for IVF   - Vancomycin (4/7 -4/13)  - C/O Abd pain and cefepime changed to zosyn on 4/18  - Zosyn d/c on 4/20 and cefepime restarted   - Continue home dose of Acyclovir, fluconazole and bactrim   - GI PCR and CDiff negative 4/12    FENGI:  - mIVF  - Miralax qD  - Zofran PRN  - Hydroxyzine PRN  - Famotidine BID   - AUS 4/19 with dilated loops of bowel, CT:  No evidence of bowel obstruction or colitis. Region of narrowing of the sigmoid colon which may be secondary to underdistention or possibly spasm. Thick-walled bladder which can be seen in chronic infections.  - NPO   - Hypomagnesemia secondary to chemotherapy: Mg added to IV fluids. 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 Found to have bacteremia with strep mitis. Pending improvement of ANC.     Onc:  - Cycle 4 of ICE  - Day 28  - Will plan for imaging May 1 to evaluate disease, or sooner if symptoms develop     Heme: Pancytopenia secondary to chemotherapy   - TF 7/30 due to iron overload  - S/P neulasta on 4/4  - Consider stem cell boost   - PRBCs 4/21 for 7.1 and symptoms   - PLTs given 4/24 for PLT count 23    ID: Fever and neutropenia  - BCx from 4/8, 4/9 neg and 4/10 NGTD  - BCx positive (4/7) for strepmitis   - Cefepime for 7 days + 3 non neutropenic days (4/8 - )  - Cefepime locks d/c'ed due to need for IVF   - Vancomycin (4/7 -4/13)  - C/O Abd pain and cefepime changed to zosyn on 4/18  - Zosyn d/c on 4/20 and cefepime restarted   - Continue home dose of Acyclovir, fluconazole and bactrim   - GI PCR and CDiff negative 4/12    FENGI:  - mIVF  - Miralax qD  - Zofran PRN. One dose of zofran given today as pt reports no appetite   - Hydroxyzine PRN  - Famotidine BID   - AUS 4/19 with dilated loops of bowel, CT:  No evidence of bowel obstruction or colitis. Region of narrowing of the sigmoid colon which may be secondary to underdistention or possibly spasm. Thick-walled bladder which can be seen in chronic infections.  - NPO   - Hypomagnesemia secondary to chemotherapy: Mg added to IV fluids. PO held given recent diarrhea    CVS:  - ECHO WNL    Resp:  - RA    Derm:  - Hydrocortisone  - Zinc Oxide   teach back/(2) meets goals/outcomes/verbalization

## 2023-04-24 NOTE — HEALTH RISK ASSESSMENT
[No falls in past year] : Patient reported no falls in the past year [0] : 2) Feeling down, depressed, or hopeless: Not at all (0) [PHQ-2 Negative - No further assessment needed] : PHQ-2 Negative - No further assessment needed [Never] : Never [[id number]] : 0

## 2023-04-24 NOTE — PLAN
[FreeTextEntry1] : Labs done prior to visit reviewed with Zonia today. All labs are wnl, incl B12 level which has risen signif into optimal range with current b12 supplementation so she should continue same, and LDL which has decreased from 120 to 99 with the higher dose of Rosuvastatin 10 mg daily. \par \par Continue all medications as prescribed. \par \par LDL goal <=100 ideally.  Reviewed risks/benefits of statin med. Recommended excellent hydration +/- co Q10 (100-400 mg daily) to decrease risk for statin related myalgias. \par  \par Discussed clean eating (eg Mediterranean style eating plan) and regular exercise/staying as physically active as possible. Include balance exercises and strength training and core strengthening exercises for bone health and to decrease risk for falls.\par \par Referral to rheum for further eval/clarification of Sjogren's syndrome and treatment recommendations. She will cont to hydrate well, use eye gtts if needed, needs Q6 mos and prn ophtho exams while on Plaquenil, regular dental visits, keep mouth moist etc. \par \par Reviewed importance of good self care (e.g. meditation, yoga, adequate rest, regular exercise, magnesium, clean eating, etc.).\par \par Follow up with specialists as recommended by them. \par \par Schedule CPE/RPA visit (chol, IFG etc) in about 6 months. \par \par Face-to-face time spent with patient, over half in discussion of the above diagnoses and treatment plan: 40 minutes.\par

## 2023-04-24 NOTE — REVIEW OF SYSTEMS
[Constipation] : constipation [Heartburn] : heartburn [Joint Pain] : joint pain [Joint Stiffness] : joint stiffness [Depression] : depression [Abdominal Pain] : no abdominal pain [Diarrhea] : diarrhea [Vomiting] : no vomiting [Melena] : no melena [Suicidal] : not suicidal [Insomnia] : no insomnia [Anxiety] : no anxiety [Negative] : Neurological [FreeTextEntry4] : +angular chelitis/dry mouth/sores in mouth at times, saw derm and is taking B vits, had biopsy showing Sjogren's, working to hydrate well  [FreeTextEntry7] : +GERD, well controlled on daily PPI med [FreeTextEntry9] : OA related joint stiffness and pain that she manages with keeping physically active  [de-identified] : h/o depression, feeling well on low dose sertraline and wishes to cont same for now

## 2023-04-24 NOTE — PHYSICAL EXAM
[No Acute Distress] : no acute distress [Well Nourished] : well nourished [Well Developed] : well developed [Well-Appearing] : well-appearing [Normal Sclera/Conjunctiva] : normal sclera/conjunctiva [EOMI] : extraocular movements intact [Normal Outer Ear/Nose] : the outer ears and nose were normal in appearance [No JVD] : no jugular venous distention [No Lymphadenopathy] : no lymphadenopathy [Supple] : supple [Thyroid Normal, No Nodules] : the thyroid was normal and there were no nodules present [No Respiratory Distress] : no respiratory distress  [No Accessory Muscle Use] : no accessory muscle use [Clear to Auscultation] : lungs were clear to auscultation bilaterally [Normal Rate] : normal rate  [Regular Rhythm] : with a regular rhythm [Normal S1, S2] : normal S1 and S2 [No Murmur] : no murmur heard [No Carotid Bruits] : no carotid bruits [No Varicosities] : no varicosities [Pedal Pulses Present] : the pedal pulses are present [No Edema] : there was no peripheral edema [No Extremity Clubbing/Cyanosis] : no extremity clubbing/cyanosis [Soft] : abdomen soft [Non Tender] : non-tender [Non-distended] : non-distended [No Masses] : no abdominal mass palpated [No HSM] : no HSM [Normal Bowel Sounds] : normal bowel sounds [No Joint Swelling] : no joint swelling [Grossly Normal Strength/Tone] : grossly normal strength/tone [No Rash] : no rash [Coordination Grossly Intact] : coordination grossly intact [No Focal Deficits] : no focal deficits [Normal Gait] : normal gait [Deep Tendon Reflexes (DTR)] : deep tendon reflexes were 2+ and symmetric [Normal Affect] : the affect was normal [Normal Insight/Judgement] : insight and judgment were intact [Normal Posterior Cervical Nodes] : no posterior cervical lymphadenopathy [Normal Anterior Cervical Nodes] : no anterior cervical lymphadenopathy [de-identified] : +angular chelitis (mild) noted on exam today

## 2023-04-24 NOTE — ASSESSMENT
[FreeTextEntry1] : ZONIA WU is a 72 year old female here for follow up on medical issues as noted above.\par \par Zonia is here to follow up on hypercholesterolemia, osteoarthritis, osteopenia, multiple thyroid nodules, GERD, left ovarian cyst and h/o depression. She was also recently dx'ed with Sjogren's based on skin biopsy of oral mucosa (ssA and ssB labs negative) and is now on Plaquenil RX  (3) adequate

## 2023-05-02 ENCOUNTER — APPOINTMENT (OUTPATIENT)
Dept: ULTRASOUND IMAGING | Facility: CLINIC | Age: 73
End: 2023-05-02

## 2023-05-19 ENCOUNTER — APPOINTMENT (OUTPATIENT)
Dept: RHEUMATOLOGY | Facility: CLINIC | Age: 73
End: 2023-05-19

## 2023-05-24 ENCOUNTER — APPOINTMENT (OUTPATIENT)
Dept: OBGYN | Facility: CLINIC | Age: 73
End: 2023-05-24
Payer: COMMERCIAL

## 2023-05-24 PROCEDURE — 99214 OFFICE O/P EST MOD 30 MIN: CPT

## 2023-05-24 NOTE — HISTORY OF PRESENT ILLNESS
[FreeTextEntry1] : Patient is a 72-year-old female who presents for a follow-up consultation to discuss test results and treatment options.  Patient presents with her .  Patient had requested a pelvic ultrasound which revealed a left ovarian cyst hyperechoic.  Patient had an oval 1+ blood test which revealed elevated risk of malignancy although  component was within normal limits.  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  In view of these findings discussed these results with the patient patient advised to consult with gynecologic oncology as patient will most likely need surgical intervention that of hysterectomy and removal of tubes and ovaries.  Risk benefits and alternatives discussed with the patient and questions answered.  Patient given referral for gynecologic oncology consultation.  Instructions and precautions reviewed.\par \par 30 minutes of face time

## 2023-06-07 ENCOUNTER — APPOINTMENT (OUTPATIENT)
Dept: GYNECOLOGIC ONCOLOGY | Facility: CLINIC | Age: 73
End: 2023-06-07
Payer: MEDICARE

## 2023-06-07 VITALS
DIASTOLIC BLOOD PRESSURE: 80 MMHG | WEIGHT: 160 LBS | OXYGEN SATURATION: 75 % | RESPIRATION RATE: 16 BRPM | HEART RATE: 76 BPM | HEIGHT: 64 IN | SYSTOLIC BLOOD PRESSURE: 133 MMHG | BODY MASS INDEX: 27.31 KG/M2

## 2023-06-07 PROCEDURE — 99204 OFFICE O/P NEW MOD 45 MIN: CPT

## 2023-06-20 NOTE — END OF VISIT
[FreeTextEntry3] : Ultimately, the patient elected to wait to make decision and will discuss further with  and family. She will call office with her decision in the next week.

## 2023-06-20 NOTE — ASSESSMENT
[FreeTextEntry1] : In summary, this is a 71yo F with incidental finding of 1-2cm solid appearing ovarian masses. We discussed with patient that as these masses were seen at time of surgery 2y ago, they are likely stable and represent fibromas. However, we discussed that with the elevated OVA-1 score, there is a possibility that there may be another process causing this elevation that could be or could not be related to GYN origin. With elevated OVA-1 and solid ovarian tumor, the chance of malignancy was referenced to be ~15%. Due to patient's history or rectosigmoid resection and known history of abdominal scar tissue, the patient was counseled that risks of surgery are higher than the average patient, and she would have to weigh the risks and benefits of surgery vs observation. \par \par The patient was counseled that if she were to elect observation, we would repeat OVA-1 and pelvic sonogram in 3-4mo. If, instead, she would be more comfortable with surgically removing GYN organs, we would recommend robot- assisted TLH, BSO with frozen section and pelvic washings. Risks of surgery include bleeding, infection, damage to nearby structures including bowel, bladder, vessels, nerves. Risks of observation is the possibility of missing an early stage GYN malignancy. Additionally, the patient was made aware that even with surgery, tumor markers could remain elevated as they could be affected by a number of different influences. \par \par \par

## 2023-06-20 NOTE — PHYSICAL EXAM
[Chaperone Present] : A chaperone was present in the examining room during all aspects of the physical examination [Normal] : Parametria: Normal [Fully active, able to carry on all pre-disease performance without restriction] : Status 0 - Fully active, able to carry on all pre-disease performance without restriction [FreeTextEntry1] : Francia Hart [de-identified] : +cystocele [de-identified] : +cystocele, atrophic changes [de-identified] : small, mobile [de-identified] : unable to palpate masses

## 2023-07-28 ENCOUNTER — TRANSCRIPTION ENCOUNTER (OUTPATIENT)
Age: 73
End: 2023-07-28

## 2023-07-31 ENCOUNTER — RX RENEWAL (OUTPATIENT)
Age: 73
End: 2023-07-31

## 2023-07-31 ENCOUNTER — TRANSCRIPTION ENCOUNTER (OUTPATIENT)
Age: 73
End: 2023-07-31

## 2023-08-02 ENCOUNTER — TRANSCRIPTION ENCOUNTER (OUTPATIENT)
Age: 73
End: 2023-08-02

## 2023-08-04 ENCOUNTER — RX RENEWAL (OUTPATIENT)
Age: 73
End: 2023-08-04

## 2023-08-10 ENCOUNTER — OUTPATIENT (OUTPATIENT)
Dept: OUTPATIENT SERVICES | Facility: HOSPITAL | Age: 73
LOS: 1 days | End: 2023-08-10
Payer: MEDICARE

## 2023-08-10 ENCOUNTER — APPOINTMENT (OUTPATIENT)
Dept: ULTRASOUND IMAGING | Facility: CLINIC | Age: 73
End: 2023-08-10
Payer: MEDICARE

## 2023-08-10 ENCOUNTER — TRANSCRIPTION ENCOUNTER (OUTPATIENT)
Age: 73
End: 2023-08-10

## 2023-08-10 DIAGNOSIS — E04.2 NONTOXIC MULTINODULAR GOITER: ICD-10-CM

## 2023-08-10 DIAGNOSIS — Z98.49 CATARACT EXTRACTION STATUS, UNSPECIFIED EYE: Chronic | ICD-10-CM

## 2023-08-10 DIAGNOSIS — Z98.890 OTHER SPECIFIED POSTPROCEDURAL STATES: Chronic | ICD-10-CM

## 2023-08-10 PROCEDURE — 76536 US EXAM OF HEAD AND NECK: CPT | Mod: 26

## 2023-08-10 PROCEDURE — 76536 US EXAM OF HEAD AND NECK: CPT

## 2023-08-16 ENCOUNTER — TRANSCRIPTION ENCOUNTER (OUTPATIENT)
Age: 73
End: 2023-08-16

## 2023-08-23 ENCOUNTER — LABORATORY RESULT (OUTPATIENT)
Age: 73
End: 2023-08-23

## 2023-09-05 ENCOUNTER — APPOINTMENT (OUTPATIENT)
Dept: ULTRASOUND IMAGING | Facility: CLINIC | Age: 73
End: 2023-09-05
Payer: MEDICARE

## 2023-09-05 PROCEDURE — 76856 US EXAM PELVIC COMPLETE: CPT

## 2023-09-05 PROCEDURE — 76830 TRANSVAGINAL US NON-OB: CPT

## 2023-09-06 ENCOUNTER — APPOINTMENT (OUTPATIENT)
Dept: GYNECOLOGIC ONCOLOGY | Facility: CLINIC | Age: 73
End: 2023-09-06
Payer: MEDICARE

## 2023-09-06 DIAGNOSIS — D27.9 BENIGN NEOPLASM OF UNSPECIFIED OVARY: ICD-10-CM

## 2023-09-06 PROCEDURE — 99213 OFFICE O/P EST LOW 20 MIN: CPT

## 2023-09-06 NOTE — PHYSICAL EXAM
[Chaperone Present] : A chaperone was present in the examining room during all aspects of the physical examination [FreeTextEntry1] : Tina Huddleston MA was present the entire duration of the patient interaction and gynecological exam. [Normal] : Auscultation of heart: Normal rate and rhythm, normal S1 and S2, no murmurs [Restricted in physically strenuous activity but ambulatory and able to carry out work of a light or sedentary nature] : Status 1- Restricted in physically strenuous activity but ambulatory and able to carry out work of a light or sedentary nature, e.g., light house work, office work

## 2023-09-06 NOTE — END OF VISIT
[FreeTextEntry3] : Follow up with Dr. Sarabia in 1 year for US and well woman exam Follow up with any new concerns  [FreeTextEntry2] : Tina Huddleston MA was present the entire duration of the patient interaction and gynecological exam.

## 2023-09-06 NOTE — HISTORY OF PRESENT ILLNESS
[FreeTextEntry1] : This is a 74yo F  with incidental finding of 1-2cm solid appearing ovarian masses.  She had requested a pelvic sonogram for surveillance from outside provider which initially revealed the masses. We discussed with patient that as these masses were seen at time of surgery 2y ago, they are likely stable and represent fibromas. However, we discussed that with the elevated OVA-1 score, there is a possibility that there may be another process causing this elevation that could be or could not be related to GYN origin. With elevated OVA-1 and solid ovarian tumor, the chance of malignancy was referenced to be ~15%. Due to patient's history or rectosigmoid resection and known history of abdominal scar tissue, the patient was counseled that risks of surgery are higher than the average patient, and she would have to weigh the risks and benefits of surgery vs observation. Recommended robot- assisted TLH, BSO with frozen section and pelvic washings. She denies pain, bleeding, changes in weight, appetite or activity level. She reports anxiety of cancer due to mother history of bladder cancer (she was a smoker). She presents to office for a surgical discussion.  Of note, patient had surgery for colonic stricture in 2021, and the operative report dictates "the ovaries appeared to have some benign postmenopausal fibromas present." There was a rectosigmoid stricture which was removed and final pathology revealed endometriosis involving stricture site, and an area of colonic diverticula.  Pelvic US 9/5/23: Bilateral ovarian hypoechoic nodules again seen as on prior MRI. In conjunction with the appearance on prior MRI, findings may represent lesions on the spectrum of fibroma, fibrothecoma, or Aarti tumor. Continued sonographic and/or MRI follow-up is advised if no intervention is planned.   08/28/2023;21.4 [id number] 08/28/2023: 3.8 HEA 08/28/2023: DNR

## 2023-09-06 NOTE — ASSESSMENT
[FreeTextEntry1] : This is a 74yo F  with incidental finding of 1-2cm solid appearing ovarian masses. The patient had elevated OVA score and presents for follow up. She had repeat US done and repeat OVA score and presents to discuss further management.   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 The OVA score is also low risk which is consisent with the imaging. Given the fluctuation in the score I would not recommend its use in the future given the small ovarian fibromas. The studies were not designed to include women with fibromas and likely the prediction score is worse. I would recommend follow up in 1 year with US to document 2 year stability. She could consider continued follow up after that or based on new symptoms only depending on patient comfort level.   We again discussed that surgery is an option however given stability risk of surgery likely outweigh benefit and the patient is in agreement.

## 2023-09-06 NOTE — REASON FOR VISIT
[FreeTextEntry1] : [facility]    [address] [place] [address], [id number] [phone]

## 2023-09-11 ENCOUNTER — TRANSCRIPTION ENCOUNTER (OUTPATIENT)
Age: 73
End: 2023-09-11

## 2023-09-11 RX ORDER — HYDROXYCHLOROQUINE SULFATE 200 MG/1
200 TABLET ORAL TWICE DAILY
Refills: 0 | Status: ACTIVE | COMMUNITY

## 2023-10-26 ENCOUNTER — APPOINTMENT (OUTPATIENT)
Dept: FAMILY MEDICINE | Facility: CLINIC | Age: 73
End: 2023-10-26
Payer: MEDICARE

## 2023-10-26 PROCEDURE — 36415 COLL VENOUS BLD VENIPUNCTURE: CPT

## 2023-10-27 ENCOUNTER — APPOINTMENT (OUTPATIENT)
Dept: RHEUMATOLOGY | Facility: CLINIC | Age: 73
End: 2023-10-27
Payer: MEDICARE

## 2023-10-27 ENCOUNTER — RESULT CHARGE (OUTPATIENT)
Age: 73
End: 2023-10-27

## 2023-10-27 ENCOUNTER — NON-APPOINTMENT (OUTPATIENT)
Age: 73
End: 2023-10-27

## 2023-10-27 VITALS
BODY MASS INDEX: 26.46 KG/M2 | OXYGEN SATURATION: 97 % | DIASTOLIC BLOOD PRESSURE: 70 MMHG | HEART RATE: 76 BPM | TEMPERATURE: 97.3 F | SYSTOLIC BLOOD PRESSURE: 114 MMHG | WEIGHT: 155 LBS | HEIGHT: 64 IN

## 2023-10-27 PROCEDURE — 99204 OFFICE O/P NEW MOD 45 MIN: CPT

## 2023-10-27 RX ORDER — ACETAMINOPHEN 325 MG
TABLET ORAL
Refills: 0 | Status: DISCONTINUED | COMMUNITY
End: 2023-10-27

## 2023-10-28 LAB
ALBUMIN SERPL ELPH-MCNC: 4.4 G/DL
ALP BLD-CCNC: 79 U/L
ALT SERPL-CCNC: 34 U/L
ANION GAP SERPL CALC-SCNC: 13 MMOL/L
AST SERPL-CCNC: 32 U/L
BASOPHILS # BLD AUTO: 0.05 K/UL
BASOPHILS NFR BLD AUTO: 1.1 %
BILIRUB SERPL-MCNC: 0.4 MG/DL
BUN SERPL-MCNC: 18 MG/DL
CALCIUM SERPL-MCNC: 9.6 MG/DL
CHLORIDE SERPL-SCNC: 105 MMOL/L
CHOLEST SERPL-MCNC: 171 MG/DL
CO2 SERPL-SCNC: 25 MMOL/L
CREAT SERPL-MCNC: 0.8 MG/DL
EGFR: 78 ML/MIN/1.73M2
EOSINOPHIL # BLD AUTO: 0.07 K/UL
EOSINOPHIL NFR BLD AUTO: 1.6 %
GLUCOSE SERPL-MCNC: 86 MG/DL
HCT VFR BLD CALC: 39.9 %
HDLC SERPL-MCNC: 88 MG/DL
HGB BLD-MCNC: 12.8 G/DL
IMM GRANULOCYTES NFR BLD AUTO: 0.2 %
LDLC SERPL CALC-MCNC: 71 MG/DL
LYMPHOCYTES # BLD AUTO: 0.65 K/UL
LYMPHOCYTES NFR BLD AUTO: 14.7 %
MAN DIFF?: NORMAL
MCHC RBC-ENTMCNC: 29 PG
MCHC RBC-ENTMCNC: 32.1 GM/DL
MCV RBC AUTO: 90.5 FL
MONOCYTES # BLD AUTO: 0.39 K/UL
MONOCYTES NFR BLD AUTO: 8.8 %
NEUTROPHILS # BLD AUTO: 3.24 K/UL
NEUTROPHILS NFR BLD AUTO: 73.6 %
NONHDLC SERPL-MCNC: 83 MG/DL
PLATELET # BLD AUTO: 345 K/UL
POTASSIUM SERPL-SCNC: 4.2 MMOL/L
PROT SERPL-MCNC: 6.5 G/DL
RBC # BLD: 4.41 M/UL
RBC # FLD: 13.1 %
SODIUM SERPL-SCNC: 143 MMOL/L
TRIGL SERPL-MCNC: 61 MG/DL
TSH SERPL-ACNC: 2.33 UIU/ML
WBC # FLD AUTO: 4.41 K/UL

## 2023-10-30 LAB
ALBUMIN MFR SERPL ELPH: 62.2 %
ALBUMIN SERPL-MCNC: 4.2 G/DL
ALBUMIN/GLOB SERPL: 1.7 RATIO
ALPHA1 GLOB MFR SERPL ELPH: 4.7 %
ALPHA1 GLOB SERPL ELPH-MCNC: 0.3 G/DL
ALPHA2 GLOB MFR SERPL ELPH: 11 %
ALPHA2 GLOB SERPL ELPH-MCNC: 0.7 G/DL
APPEARANCE: CLEAR
B-GLOBULIN MFR SERPL ELPH: 10.8 %
B-GLOBULIN SERPL ELPH-MCNC: 0.7 G/DL
BILIRUBIN URINE: NEGATIVE
BLOOD URINE: NEGATIVE
COLOR: YELLOW
CRP SERPL-MCNC: <3 MG/L
DSDNA AB SER-ACNC: <12 IU/ML
ENA RNP AB SER IA-ACNC: 0.5 AL
ENA SM AB SER IA-ACNC: <0.2 AL
ENA SS-A AB SER IA-ACNC: <0.2 AL
ENA SS-B AB SER IA-ACNC: <0.2 AL
ERYTHROCYTE [SEDIMENTATION RATE] IN BLOOD BY WESTERGREN METHOD: 16 MM/HR
GAMMA GLOB FLD ELPH-MCNC: 0.8 G/DL
GAMMA GLOB MFR SERPL ELPH: 11.3 %
GLUCOSE QUALITATIVE U: NEGATIVE MG/DL
INTERPRETATION SERPL IEP-IMP: NORMAL
KETONES URINE: NEGATIVE MG/DL
LEUKOCYTE ESTERASE URINE: NEGATIVE
M PROTEIN SPEC IFE-MCNC: NORMAL
NITRITE URINE: NEGATIVE
PH URINE: 6.5
PROT SERPL-MCNC: 6.7 G/DL
PROT SERPL-MCNC: 6.7 G/DL
PROTEIN URINE: NEGATIVE MG/DL
RHEUMATOID FACT SER QL: 14 IU/ML
SPECIFIC GRAVITY URINE: 1.01
UROBILINOGEN URINE: 0.2 MG/DL

## 2023-10-31 LAB
ANA SER IF-ACNC: NEGATIVE
C3 SERPL-MCNC: 154 MG/DL
C4 SERPL-MCNC: 43 MG/DL
CCP AB SER IA-ACNC: <8 UNITS
RF+CCP IGG SER-IMP: NEGATIVE
THYROGLOB AB SERPL-ACNC: <1 IU/ML
THYROPEROXIDASE AB SERPL IA-ACNC: 11 IU/ML

## 2023-11-01 ENCOUNTER — NON-APPOINTMENT (OUTPATIENT)
Age: 73
End: 2023-11-01

## 2023-11-01 ENCOUNTER — APPOINTMENT (OUTPATIENT)
Dept: FAMILY MEDICINE | Facility: CLINIC | Age: 73
End: 2023-11-01
Payer: MEDICARE

## 2023-11-01 VITALS
HEIGHT: 64 IN | SYSTOLIC BLOOD PRESSURE: 112 MMHG | BODY MASS INDEX: 26.29 KG/M2 | TEMPERATURE: 97 F | OXYGEN SATURATION: 98 % | DIASTOLIC BLOOD PRESSURE: 62 MMHG | HEART RATE: 79 BPM | WEIGHT: 154 LBS

## 2023-11-01 DIAGNOSIS — N83.292 OTHER OVARIAN CYST, LEFT SIDE: ICD-10-CM

## 2023-11-01 DIAGNOSIS — M54.2 CERVICALGIA: ICD-10-CM

## 2023-11-01 DIAGNOSIS — Z00.00 ENCOUNTER FOR GENERAL ADULT MEDICAL EXAMINATION W/OUT ABNORMAL FINDINGS: ICD-10-CM

## 2023-11-01 PROCEDURE — G0439: CPT

## 2023-11-01 PROCEDURE — 93000 ELECTROCARDIOGRAM COMPLETE: CPT

## 2023-11-01 PROCEDURE — G0403: CPT

## 2023-11-01 PROCEDURE — G0402 INITIAL PREVENTIVE EXAM: CPT

## 2023-11-01 PROCEDURE — 90686 IIV4 VACC NO PRSV 0.5 ML IM: CPT

## 2023-11-01 PROCEDURE — G0008: CPT

## 2023-11-16 RX ORDER — SERTRALINE 25 MG/1
25 TABLET, FILM COATED ORAL DAILY
Qty: 90 | Refills: 3 | Status: ACTIVE | COMMUNITY
Start: 1900-01-01 | End: 1900-01-01

## 2023-11-20 ENCOUNTER — APPOINTMENT (OUTPATIENT)
Dept: OPHTHALMOLOGY | Facility: CLINIC | Age: 73
End: 2023-11-20

## 2023-12-14 ENCOUNTER — NON-APPOINTMENT (OUTPATIENT)
Age: 73
End: 2023-12-14

## 2023-12-14 ENCOUNTER — APPOINTMENT (OUTPATIENT)
Dept: OPHTHALMOLOGY | Facility: CLINIC | Age: 73
End: 2023-12-14
Payer: MEDICARE

## 2023-12-14 PROCEDURE — 92004 COMPRE OPH EXAM NEW PT 1/>: CPT

## 2023-12-14 PROCEDURE — 92283 EXTND COLOR VISION XM: CPT

## 2023-12-14 PROCEDURE — 92134 CPTRZ OPH DX IMG PST SGM RTA: CPT

## 2024-01-05 ENCOUNTER — APPOINTMENT (OUTPATIENT)
Dept: RHEUMATOLOGY | Facility: CLINIC | Age: 74
End: 2024-01-05

## 2024-01-08 ENCOUNTER — INPATIENT (INPATIENT)
Facility: HOSPITAL | Age: 74
LOS: 0 days | Discharge: HOME | DRG: 390 | End: 2024-01-09
Attending: GENERAL PRACTICE | Admitting: GENERAL PRACTICE
Payer: MEDICARE

## 2024-01-08 VITALS — WEIGHT: 149.91 LBS | HEIGHT: 64 IN

## 2024-01-08 DIAGNOSIS — K56.609 UNSPECIFIED INTESTINAL OBSTRUCTION, UNSPECIFIED AS TO PARTIAL VERSUS COMPLETE OBSTRUCTION: ICD-10-CM

## 2024-01-08 DIAGNOSIS — Z98.890 OTHER SPECIFIED POSTPROCEDURAL STATES: Chronic | ICD-10-CM

## 2024-01-08 DIAGNOSIS — Z98.49 CATARACT EXTRACTION STATUS, UNSPECIFIED EYE: Chronic | ICD-10-CM

## 2024-01-08 LAB
ABO RH CONFIRMATION: SIGNIFICANT CHANGE UP
ABO RH CONFIRMATION: SIGNIFICANT CHANGE UP
ALBUMIN SERPL ELPH-MCNC: 4 G/DL — SIGNIFICANT CHANGE UP (ref 3.3–5)
ALBUMIN SERPL ELPH-MCNC: 4 G/DL — SIGNIFICANT CHANGE UP (ref 3.3–5)
ALP SERPL-CCNC: 79 U/L — SIGNIFICANT CHANGE UP (ref 40–120)
ALP SERPL-CCNC: 79 U/L — SIGNIFICANT CHANGE UP (ref 40–120)
ALT FLD-CCNC: 47 U/L — SIGNIFICANT CHANGE UP (ref 12–78)
ALT FLD-CCNC: 47 U/L — SIGNIFICANT CHANGE UP (ref 12–78)
ANION GAP SERPL CALC-SCNC: 5 MMOL/L — SIGNIFICANT CHANGE UP (ref 5–17)
ANION GAP SERPL CALC-SCNC: 5 MMOL/L — SIGNIFICANT CHANGE UP (ref 5–17)
APPEARANCE UR: CLEAR — SIGNIFICANT CHANGE UP
APPEARANCE UR: CLEAR — SIGNIFICANT CHANGE UP
AST SERPL-CCNC: 30 U/L — SIGNIFICANT CHANGE UP (ref 15–37)
AST SERPL-CCNC: 30 U/L — SIGNIFICANT CHANGE UP (ref 15–37)
BASOPHILS # BLD AUTO: 0.05 K/UL — SIGNIFICANT CHANGE UP (ref 0–0.2)
BASOPHILS # BLD AUTO: 0.05 K/UL — SIGNIFICANT CHANGE UP (ref 0–0.2)
BASOPHILS NFR BLD AUTO: 0.4 % — SIGNIFICANT CHANGE UP (ref 0–2)
BASOPHILS NFR BLD AUTO: 0.4 % — SIGNIFICANT CHANGE UP (ref 0–2)
BILIRUB SERPL-MCNC: 0.5 MG/DL — SIGNIFICANT CHANGE UP (ref 0.2–1.2)
BILIRUB SERPL-MCNC: 0.5 MG/DL — SIGNIFICANT CHANGE UP (ref 0.2–1.2)
BILIRUB UR-MCNC: NEGATIVE — SIGNIFICANT CHANGE UP
BILIRUB UR-MCNC: NEGATIVE — SIGNIFICANT CHANGE UP
BLD GP AB SCN SERPL QL: SIGNIFICANT CHANGE UP
BLD GP AB SCN SERPL QL: SIGNIFICANT CHANGE UP
BUN SERPL-MCNC: 21 MG/DL — SIGNIFICANT CHANGE UP (ref 7–23)
BUN SERPL-MCNC: 21 MG/DL — SIGNIFICANT CHANGE UP (ref 7–23)
CALCIUM SERPL-MCNC: 9.9 MG/DL — SIGNIFICANT CHANGE UP (ref 8.5–10.1)
CALCIUM SERPL-MCNC: 9.9 MG/DL — SIGNIFICANT CHANGE UP (ref 8.5–10.1)
CHLORIDE SERPL-SCNC: 104 MMOL/L — SIGNIFICANT CHANGE UP (ref 96–108)
CHLORIDE SERPL-SCNC: 104 MMOL/L — SIGNIFICANT CHANGE UP (ref 96–108)
CO2 SERPL-SCNC: 27 MMOL/L — SIGNIFICANT CHANGE UP (ref 22–31)
CO2 SERPL-SCNC: 27 MMOL/L — SIGNIFICANT CHANGE UP (ref 22–31)
COLOR SPEC: YELLOW — SIGNIFICANT CHANGE UP
COLOR SPEC: YELLOW — SIGNIFICANT CHANGE UP
CREAT SERPL-MCNC: 1 MG/DL — SIGNIFICANT CHANGE UP (ref 0.5–1.3)
CREAT SERPL-MCNC: 1 MG/DL — SIGNIFICANT CHANGE UP (ref 0.5–1.3)
DIFF PNL FLD: NEGATIVE — SIGNIFICANT CHANGE UP
DIFF PNL FLD: NEGATIVE — SIGNIFICANT CHANGE UP
EGFR: 60 ML/MIN/1.73M2 — SIGNIFICANT CHANGE UP
EGFR: 60 ML/MIN/1.73M2 — SIGNIFICANT CHANGE UP
EOSINOPHIL # BLD AUTO: 0.01 K/UL — SIGNIFICANT CHANGE UP (ref 0–0.5)
EOSINOPHIL # BLD AUTO: 0.01 K/UL — SIGNIFICANT CHANGE UP (ref 0–0.5)
EOSINOPHIL NFR BLD AUTO: 0.1 % — SIGNIFICANT CHANGE UP (ref 0–6)
EOSINOPHIL NFR BLD AUTO: 0.1 % — SIGNIFICANT CHANGE UP (ref 0–6)
GLUCOSE SERPL-MCNC: 132 MG/DL — HIGH (ref 70–99)
GLUCOSE SERPL-MCNC: 132 MG/DL — HIGH (ref 70–99)
GLUCOSE UR QL: NEGATIVE MG/DL — SIGNIFICANT CHANGE UP
GLUCOSE UR QL: NEGATIVE MG/DL — SIGNIFICANT CHANGE UP
HCT VFR BLD CALC: 42.9 % — SIGNIFICANT CHANGE UP (ref 34.5–45)
HCT VFR BLD CALC: 42.9 % — SIGNIFICANT CHANGE UP (ref 34.5–45)
HGB BLD-MCNC: 14.6 G/DL — SIGNIFICANT CHANGE UP (ref 11.5–15.5)
HGB BLD-MCNC: 14.6 G/DL — SIGNIFICANT CHANGE UP (ref 11.5–15.5)
IMM GRANULOCYTES NFR BLD AUTO: 0.3 % — SIGNIFICANT CHANGE UP (ref 0–0.9)
IMM GRANULOCYTES NFR BLD AUTO: 0.3 % — SIGNIFICANT CHANGE UP (ref 0–0.9)
INR BLD: 1 RATIO — SIGNIFICANT CHANGE UP (ref 0.85–1.18)
INR BLD: 1 RATIO — SIGNIFICANT CHANGE UP (ref 0.85–1.18)
KETONES UR-MCNC: 15 MG/DL
KETONES UR-MCNC: 15 MG/DL
LACTATE SERPL-SCNC: 0.9 MMOL/L — SIGNIFICANT CHANGE UP (ref 0.7–2)
LACTATE SERPL-SCNC: 0.9 MMOL/L — SIGNIFICANT CHANGE UP (ref 0.7–2)
LEUKOCYTE ESTERASE UR-ACNC: NEGATIVE — SIGNIFICANT CHANGE UP
LEUKOCYTE ESTERASE UR-ACNC: NEGATIVE — SIGNIFICANT CHANGE UP
LIDOCAIN IGE QN: 93 U/L — HIGH (ref 13–75)
LIDOCAIN IGE QN: 93 U/L — HIGH (ref 13–75)
LYMPHOCYTES # BLD AUTO: 0.7 K/UL — LOW (ref 1–3.3)
LYMPHOCYTES # BLD AUTO: 0.7 K/UL — LOW (ref 1–3.3)
LYMPHOCYTES # BLD AUTO: 5.2 % — LOW (ref 13–44)
LYMPHOCYTES # BLD AUTO: 5.2 % — LOW (ref 13–44)
MCHC RBC-ENTMCNC: 29.2 PG — SIGNIFICANT CHANGE UP (ref 27–34)
MCHC RBC-ENTMCNC: 29.2 PG — SIGNIFICANT CHANGE UP (ref 27–34)
MCHC RBC-ENTMCNC: 34 GM/DL — SIGNIFICANT CHANGE UP (ref 32–36)
MCHC RBC-ENTMCNC: 34 GM/DL — SIGNIFICANT CHANGE UP (ref 32–36)
MCV RBC AUTO: 85.8 FL — SIGNIFICANT CHANGE UP (ref 80–100)
MCV RBC AUTO: 85.8 FL — SIGNIFICANT CHANGE UP (ref 80–100)
MONOCYTES # BLD AUTO: 0.63 K/UL — SIGNIFICANT CHANGE UP (ref 0–0.9)
MONOCYTES # BLD AUTO: 0.63 K/UL — SIGNIFICANT CHANGE UP (ref 0–0.9)
MONOCYTES NFR BLD AUTO: 4.7 % — SIGNIFICANT CHANGE UP (ref 2–14)
MONOCYTES NFR BLD AUTO: 4.7 % — SIGNIFICANT CHANGE UP (ref 2–14)
NEUTROPHILS # BLD AUTO: 11.92 K/UL — HIGH (ref 1.8–7.4)
NEUTROPHILS # BLD AUTO: 11.92 K/UL — HIGH (ref 1.8–7.4)
NEUTROPHILS NFR BLD AUTO: 89.3 % — HIGH (ref 43–77)
NEUTROPHILS NFR BLD AUTO: 89.3 % — HIGH (ref 43–77)
NITRITE UR-MCNC: NEGATIVE — SIGNIFICANT CHANGE UP
NITRITE UR-MCNC: NEGATIVE — SIGNIFICANT CHANGE UP
PH UR: 6 — SIGNIFICANT CHANGE UP (ref 5–8)
PH UR: 6 — SIGNIFICANT CHANGE UP (ref 5–8)
PLATELET # BLD AUTO: 420 K/UL — HIGH (ref 150–400)
PLATELET # BLD AUTO: 420 K/UL — HIGH (ref 150–400)
POTASSIUM SERPL-MCNC: 4.2 MMOL/L — SIGNIFICANT CHANGE UP (ref 3.5–5.3)
POTASSIUM SERPL-MCNC: 4.2 MMOL/L — SIGNIFICANT CHANGE UP (ref 3.5–5.3)
POTASSIUM SERPL-SCNC: 4.2 MMOL/L — SIGNIFICANT CHANGE UP (ref 3.5–5.3)
POTASSIUM SERPL-SCNC: 4.2 MMOL/L — SIGNIFICANT CHANGE UP (ref 3.5–5.3)
PROT SERPL-MCNC: 7.5 GM/DL — SIGNIFICANT CHANGE UP (ref 6–8.3)
PROT SERPL-MCNC: 7.5 GM/DL — SIGNIFICANT CHANGE UP (ref 6–8.3)
PROT UR-MCNC: SIGNIFICANT CHANGE UP MG/DL
PROT UR-MCNC: SIGNIFICANT CHANGE UP MG/DL
PROTHROM AB SERPL-ACNC: 11.3 SEC — SIGNIFICANT CHANGE UP (ref 9.5–13)
PROTHROM AB SERPL-ACNC: 11.3 SEC — SIGNIFICANT CHANGE UP (ref 9.5–13)
RBC # BLD: 5 M/UL — SIGNIFICANT CHANGE UP (ref 3.8–5.2)
RBC # BLD: 5 M/UL — SIGNIFICANT CHANGE UP (ref 3.8–5.2)
RBC # FLD: 12.6 % — SIGNIFICANT CHANGE UP (ref 10.3–14.5)
RBC # FLD: 12.6 % — SIGNIFICANT CHANGE UP (ref 10.3–14.5)
SODIUM SERPL-SCNC: 136 MMOL/L — SIGNIFICANT CHANGE UP (ref 135–145)
SODIUM SERPL-SCNC: 136 MMOL/L — SIGNIFICANT CHANGE UP (ref 135–145)
SP GR SPEC: >1.03 — HIGH (ref 1–1.03)
SP GR SPEC: >1.03 — HIGH (ref 1–1.03)
UROBILINOGEN FLD QL: 0.2 MG/DL — SIGNIFICANT CHANGE UP (ref 0.2–1)
UROBILINOGEN FLD QL: 0.2 MG/DL — SIGNIFICANT CHANGE UP (ref 0.2–1)
WBC # BLD: 13.35 K/UL — HIGH (ref 3.8–10.5)
WBC # BLD: 13.35 K/UL — HIGH (ref 3.8–10.5)
WBC # FLD AUTO: 13.35 K/UL — HIGH (ref 3.8–10.5)
WBC # FLD AUTO: 13.35 K/UL — HIGH (ref 3.8–10.5)

## 2024-01-08 PROCEDURE — 36415 COLL VENOUS BLD VENIPUNCTURE: CPT

## 2024-01-08 PROCEDURE — 93005 ELECTROCARDIOGRAM TRACING: CPT

## 2024-01-08 PROCEDURE — 99285 EMERGENCY DEPT VISIT HI MDM: CPT

## 2024-01-08 PROCEDURE — 87086 URINE CULTURE/COLONY COUNT: CPT

## 2024-01-08 PROCEDURE — 74018 RADEX ABDOMEN 1 VIEW: CPT | Mod: 26,XU

## 2024-01-08 PROCEDURE — 74019 RADEX ABDOMEN 2 VIEWS: CPT

## 2024-01-08 PROCEDURE — 85610 PROTHROMBIN TIME: CPT

## 2024-01-08 PROCEDURE — 74018 RADEX ABDOMEN 1 VIEW: CPT

## 2024-01-08 PROCEDURE — C9113: CPT

## 2024-01-08 PROCEDURE — 86803 HEPATITIS C AB TEST: CPT

## 2024-01-08 PROCEDURE — 80048 BASIC METABOLIC PNL TOTAL CA: CPT

## 2024-01-08 PROCEDURE — 83605 ASSAY OF LACTIC ACID: CPT

## 2024-01-08 PROCEDURE — 93010 ELECTROCARDIOGRAM REPORT: CPT

## 2024-01-08 PROCEDURE — 86901 BLOOD TYPING SEROLOGIC RH(D): CPT

## 2024-01-08 PROCEDURE — 74250 X-RAY XM SM INT 1CNTRST STD: CPT

## 2024-01-08 PROCEDURE — 86900 BLOOD TYPING SEROLOGIC ABO: CPT

## 2024-01-08 PROCEDURE — 81003 URINALYSIS AUTO W/O SCOPE: CPT

## 2024-01-08 PROCEDURE — 86850 RBC ANTIBODY SCREEN: CPT

## 2024-01-08 PROCEDURE — 74177 CT ABD & PELVIS W/CONTRAST: CPT | Mod: 26,ME

## 2024-01-08 PROCEDURE — 74250 X-RAY XM SM INT 1CNTRST STD: CPT | Mod: 26

## 2024-01-08 PROCEDURE — 85027 COMPLETE CBC AUTOMATED: CPT

## 2024-01-08 PROCEDURE — G1004: CPT

## 2024-01-08 RX ORDER — ACETAMINOPHEN 500 MG
1000 TABLET ORAL ONCE
Refills: 0 | Status: COMPLETED | OUTPATIENT
Start: 2024-01-08 | End: 2024-01-08

## 2024-01-08 RX ORDER — CHOLECALCIFEROL (VITAMIN D3) 125 MCG
1 CAPSULE ORAL
Refills: 0 | DISCHARGE

## 2024-01-08 RX ORDER — SERTRALINE 25 MG/1
1 TABLET, FILM COATED ORAL
Qty: 0 | Refills: 0 | DISCHARGE

## 2024-01-08 RX ORDER — ROSUVASTATIN CALCIUM 5 MG/1
1 TABLET ORAL
Qty: 0 | Refills: 0 | DISCHARGE

## 2024-01-08 RX ORDER — OMEPRAZOLE 10 MG/1
1 CAPSULE, DELAYED RELEASE ORAL
Qty: 0 | Refills: 0 | DISCHARGE

## 2024-01-08 RX ORDER — PREGABALIN 225 MG/1
1 CAPSULE ORAL
Refills: 0 | DISCHARGE

## 2024-01-08 RX ORDER — MORPHINE SULFATE 50 MG/1
4 CAPSULE, EXTENDED RELEASE ORAL ONCE
Refills: 0 | Status: DISCONTINUED | OUTPATIENT
Start: 2024-01-08 | End: 2024-01-08

## 2024-01-08 RX ORDER — HYDROXYCHLOROQUINE SULFATE 200 MG
1 TABLET ORAL
Refills: 0 | DISCHARGE

## 2024-01-08 RX ORDER — SODIUM CHLORIDE 9 MG/ML
1000 INJECTION, SOLUTION INTRAVENOUS
Refills: 0 | Status: DISCONTINUED | OUTPATIENT
Start: 2024-01-08 | End: 2024-01-09

## 2024-01-08 RX ORDER — SODIUM CHLORIDE 9 MG/ML
1000 INJECTION INTRAMUSCULAR; INTRAVENOUS; SUBCUTANEOUS ONCE
Refills: 0 | Status: COMPLETED | OUTPATIENT
Start: 2024-01-08 | End: 2024-01-08

## 2024-01-08 RX ORDER — ONDANSETRON 8 MG/1
4 TABLET, FILM COATED ORAL ONCE
Refills: 0 | Status: COMPLETED | OUTPATIENT
Start: 2024-01-08 | End: 2024-01-08

## 2024-01-08 RX ORDER — HEPARIN SODIUM 5000 [USP'U]/ML
5000 INJECTION INTRAVENOUS; SUBCUTANEOUS EVERY 8 HOURS
Refills: 0 | Status: DISCONTINUED | OUTPATIENT
Start: 2024-01-08 | End: 2024-01-09

## 2024-01-08 RX ORDER — PANTOPRAZOLE SODIUM 20 MG/1
40 TABLET, DELAYED RELEASE ORAL DAILY
Refills: 0 | Status: DISCONTINUED | OUTPATIENT
Start: 2024-01-08 | End: 2024-01-09

## 2024-01-08 RX ORDER — ROSUVASTATIN CALCIUM 5 MG/1
1 TABLET ORAL
Refills: 0 | DISCHARGE

## 2024-01-08 RX ORDER — SODIUM CHLORIDE 9 MG/ML
1000 INJECTION, SOLUTION INTRAVENOUS
Refills: 0 | Status: DISCONTINUED | OUTPATIENT
Start: 2024-01-08 | End: 2024-01-08

## 2024-01-08 RX ORDER — ONDANSETRON 8 MG/1
4 TABLET, FILM COATED ORAL EVERY 6 HOURS
Refills: 0 | Status: DISCONTINUED | OUTPATIENT
Start: 2024-01-08 | End: 2024-01-09

## 2024-01-08 RX ORDER — ONDANSETRON 8 MG/1
4 TABLET, FILM COATED ORAL ONCE
Refills: 0 | Status: DISCONTINUED | OUTPATIENT
Start: 2024-01-08 | End: 2024-01-08

## 2024-01-08 RX ADMIN — Medication 400 MILLIGRAM(S): at 16:23

## 2024-01-08 RX ADMIN — MORPHINE SULFATE 4 MILLIGRAM(S): 50 CAPSULE, EXTENDED RELEASE ORAL at 07:12

## 2024-01-08 RX ADMIN — SODIUM CHLORIDE 110 MILLILITER(S): 9 INJECTION, SOLUTION INTRAVENOUS at 10:13

## 2024-01-08 RX ADMIN — Medication 1000 MILLIGRAM(S): at 10:23

## 2024-01-08 RX ADMIN — HEPARIN SODIUM 5000 UNIT(S): 5000 INJECTION INTRAVENOUS; SUBCUTANEOUS at 21:07

## 2024-01-08 RX ADMIN — ONDANSETRON 4 MILLIGRAM(S): 8 TABLET, FILM COATED ORAL at 04:12

## 2024-01-08 RX ADMIN — Medication 400 MILLIGRAM(S): at 10:13

## 2024-01-08 RX ADMIN — Medication 400 MILLIGRAM(S): at 04:12

## 2024-01-08 RX ADMIN — Medication 400 MILLIGRAM(S): at 22:59

## 2024-01-08 RX ADMIN — Medication 1000 MILLIGRAM(S): at 16:27

## 2024-01-08 RX ADMIN — SODIUM CHLORIDE 2000 MILLILITER(S): 9 INJECTION INTRAMUSCULAR; INTRAVENOUS; SUBCUTANEOUS at 04:13

## 2024-01-08 RX ADMIN — Medication 1000 MILLIGRAM(S): at 22:59

## 2024-01-08 RX ADMIN — HEPARIN SODIUM 5000 UNIT(S): 5000 INJECTION INTRAVENOUS; SUBCUTANEOUS at 14:43

## 2024-01-08 RX ADMIN — PANTOPRAZOLE SODIUM 40 MILLIGRAM(S): 20 TABLET, DELAYED RELEASE ORAL at 10:13

## 2024-01-08 NOTE — H&P ADULT - NSICDXFAMILYHX_GEN_ALL_CORE_FT
FAMILY HISTORY:  Mother  Still living? Unknown  FHx: bladder cancer, Age at diagnosis: Age Unknown

## 2024-01-08 NOTE — H&P ADULT - PROBLEM SELECTOR PLAN 1
NPO, NGT, IVF, pain meds, DVT proph, GI proph  Pt is requesting to be transferred to Dr. Saad Cobos at [facility].  transfer being initiated.    d/w Dr. Ramirez NPO, NGT, IVF, pain meds, DVT proph, GI proph  Pt is requesting to be transferred to Dr. Saad Cobos at [facility].  transfer being initiated.    d/w Dr. Ramirez

## 2024-01-08 NOTE — H&P ADULT - HISTORY OF PRESENT ILLNESS
72yo female presents to the ED c/o abdominal pain, nausea and vomiting x3 over the last 12 hours.  She states pain started around 5pm, she had a BM and felt better, ate some dinner and then immediately following dinner she vomited 2 times and had increasing abdominal pain.  Pain is localized to b/L lower quadrants of the abdomen and worse on the left.  Pt states that nothing is making it better or worse however after vomiting she does feel slightly better and the pain medications given in the ED are helping.  Currently the pain is about 4/10.  She denies chest pain, palpitations, shortness of breath,

## 2024-01-08 NOTE — ED ADULT NURSE NOTE - OBJECTIVE STATEMENT
Pt presents to the ED c/o abdominal pain staring at 5pm last night suddenly. Pt endorsing 3 episodes of vomiting, 1x here in ER. Pt also endorsing fever/chills. Denies medical hx. IV established in right arm with a 20G, labs drawn and sent, call bell in reach, warm blanket provided, bed in lowest position, side rails up x2, MD evaluation in progress.

## 2024-01-08 NOTE — ED PROVIDER NOTE - CADM POA URETHRAL CATHETER
Patient seen in clinic with Dr. Parham for followup. Home medications, pain, allergies and status update reviewed with patient; patient denies any issues post-MW ablation on 9/8/20, denies any current pain or discomfort, denies N/V. Taking lactulose three times daily, reports about 3 bowel movements daily, denies any recent encephalopathy. Patient reports currently taking 60mg daily in am, spironolactone 150mg daily in am, reports significant improvement of lower extremity swelling, still 2+ edema but no longer has any pain. Wearing compression stockings and elevating at night. Physical assessment deferred to provider. Plan for patient is to have labs completed today (MELD labs, PEth test, urine, VRE); biannual appointment with transplant dietary; EGD in 12/2020. Will discuss listing status after repeat CT scheduled for 10/8/20, will evaluate PEth results and discuss at [facility] if appropriate. Patient verbalizes understanding of plan. No further questions or concerns at this time.     No

## 2024-01-08 NOTE — ED ADULT TRIAGE NOTE - CHIEF COMPLAINT QUOTE
Pt presents to the ED c/o abdominal pain. Pt reports sudden onset of abdominal pain was today around 5pm, it comes and goes in waves and it feels like spasms. Pt reports 2 episodes of vomiting, endorses nausea.

## 2024-01-08 NOTE — PATIENT PROFILE ADULT - FALL HARM RISK - RISK INTERVENTIONS
Assistance with ambulation/Communicate Fall Risk and Risk Factors to all staff, patient, and family/Reinforce activity limits and safety measures with patient and family/Visual Cue: Yellow wristband/Bed in lowest position, wheels locked, appropriate side rails in place/Call bell, personal items and telephone in reach/Instruct patient to call for assistance before getting out of bed or chair/Non-slip footwear when patient is out of bed/[place] to call system/Physically safe environment - no spills, clutter or unnecessary equipment/Purposeful Proactive Rounding/Room/bathroom lighting operational, light cord in reach Assistance with ambulation/Communicate Fall Risk and Risk Factors to all staff, patient, and family/Reinforce activity limits and safety measures with patient and family/Visual Cue: Yellow wristband/Bed in lowest position, wheels locked, appropriate side rails in place/Call bell, personal items and telephone in reach/Instruct patient to call for assistance before getting out of bed or chair/Non-slip footwear when patient is out of bed/[place] to call system/Physically safe environment - no spills, clutter or unnecessary equipment/Purposeful Proactive Rounding/Room/bathroom lighting operational, light cord in reach

## 2024-01-08 NOTE — H&P ADULT - NSICDXPASTMEDICALHX_GEN_ALL_CORE_FT
PAST MEDICAL HISTORY:  Anxiety     Colon polyp     Cystocele with rectocele     Hiatal hernia with GERD     HLD (hyperlipidemia)

## 2024-01-08 NOTE — ED PROVIDER NOTE - PHYSICAL EXAMINATION
GEN - NAD; well appearing; A+O x3  HEAD - NC/AT    EYES - EOMI, no conjunctival pallor, no scleral icterus  ENT -   mucous membranes  moist , no discharge  PULM - CTA b/l,  symmetric breath sounds  COR -  RRR, S1 S2, no murmurs  ABD - Left lower quadrant tenderness palpation  EXTREMS -no edema, no deformity, warm and well perfused   SKIN - no rash or bruising      NEUROLOGIC - alert, sensation nl, motor 5/5 RUE/LUE/RLE/LLE

## 2024-01-08 NOTE — ED PROVIDER NOTE - OBJECTIVE STATEMENT
73-year-old female past medical history colonic stricture status post resection presenting with abdominal pain and vomiting.  Symptoms since 5:00 tonight.  No diarrhea no fevers or chills.  No known history of diverticulitis.

## 2024-01-08 NOTE — ED PROVIDER NOTE - CLINICAL SUMMARY MEDICAL DECISION MAKING FREE TEXT BOX
Patient with history of colonic stricture resection presenting with left lower quadrant abdominal pain possible bowel obstruction possible diverticulitis will CT pain control reassess.

## 2024-01-08 NOTE — ED ADULT NURSE NOTE - NSFALLUNIVINTERV_ED_ALL_ED
Bed/Stretcher in lowest position, wheels locked, appropriate side rails in place/Call bell, personal items and telephone in reach/Instruct patient to call for assistance before getting out of bed/chair/stretcher/Non-slip footwear applied when patient is off stretcher/[place] to call system/Physically safe environment - no spills, clutter or unnecessary equipment/Purposeful proactive rounding/Room/bathroom lighting operational, light cord in reach Bed/Stretcher in lowest position, wheels locked, appropriate side rails in place/Call bell, personal items and telephone in reach/Instruct patient to call for assistance before getting out of bed/chair/stretcher/Non-slip footwear applied when patient is off stretcher/[place] to call system/Physically safe environment - no spills, clutter or unnecessary equipment/Purposeful proactive rounding/Room/bathroom lighting operational, light cord in reach

## 2024-01-08 NOTE — H&P ADULT - NSHPPHYSICALEXAM_GEN_ALL_CORE
CONSTITUTIONAL: NAD, well-groomed, well-developed  HEAD:  Atraumatic, Normocephalic  EYES: EOMI, PERRLA, conjunctiva and sclera clear  ENMT: No tonsillar erythema, exudates, or enlargement; Moist mucous membranes, Good dentition, No lesions  NECK: Supple, No JVD, Normal thyroid  NERVOUS SYSTEM:  Alert & Oriented X3, Good concentration; Motor Strength 5/5 B/L upper and lower extremities; DTRs 2+ intact and symmetric  CHEST/LUNG: Clear to ascultation bilaterally; No rales, rhonchi, wheezing, or rubs  HEART: Regular rate and rhythm; No murmurs, rubs, or gallops  ABDOMEN: hypoactive bowel sounds, Soft, Nondistended, mild tenderness to palpation  EXTREMITIES:  2+ Peripheral Pulses, No clubbing, cyanosis, or edema  LYMPH: No lymphadenopathy noted  SKIN: No rashes or lesions

## 2024-01-08 NOTE — H&P ADULT - NSICDXPASTSURGICALHX_GEN_ALL_CORE_FT
PAST SURGICAL HISTORY:  History of open sigmoidectomy     S/P cataract extraction b/l, 2010    S/P ORIF (open reduction internal fixation) fracture of Right 5th metacarpal, 2006

## 2024-01-08 NOTE — H&P ADULT - PATIENT'S PREFERRED PRONOUN
Add 52 Modifier (Optional): no
Post-Care Instructions: I reviewed with the patient in detail post-care instructions. Patient is to wear sunprotection, and avoid picking at any of the treated lesions. Pt may apply Vaseline to crusted or scabbing areas.  Cryosurgery woundcare sheet given.
Render Post-Care Instructions In Note?: yes
Detail Level: Simple
Medical Necessity Information: It is in your best interest to select a reason for this procedure from the list below. All of these items fulfill various CMS LCD requirements except the new and changing color options.
Medical Necessity Clause: This procedure was medically necessary because the lesions that were treated were: irritated and inflamed
Duration Of Freeze Thaw-Cycle (Seconds): 5-10
Consent: The patient's consent was obtained including but not limited to risks of crusting, scabbing, blistering, scarring, darker or lighter pigmentary change, recurrence, incomplete removal and infection.
Number Of Freeze-Thaw Cycles: 1 freeze-thaw cycle
Withheld/Decline to Answer

## 2024-01-08 NOTE — H&P ADULT - NSHPLABSRESULTS_GEN_ALL_CORE
14.6   13.35 )-----------( 420      ( 08 Jan 2024 03:52 )             42.9     01-08    136  |  104  |  21  ----------------------------<  132<H>  4.2   |  27  |  1.00    Ca    9.9      08 Jan 2024 03:52    TPro  7.5  /  Alb  4.0  /  TBili  0.5  /  DBili  x   /  AST  30  /  ALT  47  /  AlkPhos  79  01-08      Urinalysis Basic - ( 08 Jan 2024 06:01 )    Color: Yellow / Appearance: Clear / SG: >1.030 / pH: x  Gluc: x / Ketone: 15 mg/dL  / Bili: Negative / Urobili: 0.2 mg/dL   Blood: x / Protein: Trace mg/dL / Nitrite: Negative   Leuk Esterase: Negative / RBC: x / WBC x   Sq Epi: x / Non Sq Epi: x / Bacteria: x    ACC: [id number] EXAM:  CT ABDOMEN AND PELVIS IC   ORDERED BY: GAYATHRI LACKEY     PROCEDURE DATE:  01/08/2024      INTERPRETATION:  CLINICAL INDICATION:  Abdominal pain, acute, nonlocalized    COMPARISON: MRI of the pelvis dated 4/13/2023    TECHNIQUE: CT imaging of the abdomen and pelvis was performed with IV   contrast.    CONTRAST/COMPLICATIONS:  IV Contrast: Omnipaque 350  90 cc administered  Oral Contrast: NONE  Complications: None reported at time of study completion    FINDINGS:    LOWER CHEST: Small hiatal hernia. Subsegmental atelectasis.    ABDOMEN:  LIVER: within normal limits.  BILE DUCTS: normal caliber.  GALLBLADDER: No calcified gallstones. Normal caliber wall.  PANCREAS: within normal limits.  SPLEEN: within normal limits.  ADRENALS: within normal limits.  KIDNEYS/URETERS: within normal limits.    PELVIS:  REPRODUCTIVE ORGANS: Bilateral ovarian nodules are better evaluated on   the ultrasound of the pelvis dated 9/5/2023 and MRI of the pelvis dated   4/13/2023.  BLADDER: within normal limits.    BOWEL: Status post partial sigmoid resection. Dilated loops of   fluid-filled small bowel with a transition to collapsed loops in the   pelvis consistent with a small bowel obstruction. Small volume interloop   edema.  PERITONEUM: Small volume mildly complex free fluid in the pelvis.  VESSELS: Normal caliber aorta.  RETROPERITONEUM: within normal limits.  ABDOMINAL WALL: within normal limits.  BONES: Degenerative changes.    IMPRESSION:    Small bowel obstruction with a transition to collapsed loops in the   pelvis. Small volume interloop edema and small volume free fluid fluid in   the pelvis.    Bilateral ovarian nodules are better evaluated on the ultrasound of the   pelvis dated 9/5/2023 and MRI of the pelvis dated 4/13/2023.    --- End of Report ---    CRISTAL KNOX MD; Attending Radiologist  This document has been electronically signed. Jan 8 2024  4:57AM 14.6   13.35 )-----------( 420      ( 08 Jan 2024 03:52 )             42.9     01-08    136  |  104  |  21  ----------------------------<  132<H>  4.2   |  27  |  1.00    Ca    9.9      08 Jan 2024 03:52    TPro  7.5  /  Alb  4.0  /  TBili  0.5  /  DBili  x   /  AST  30  /  ALT  47  /  AlkPhos  79  01-08      Urinalysis Basic - ( 08 Jan 2024 06:01 )    Color: Yellow / Appearance: Clear / SG: >1.030 / pH: x  Gluc: x / Ketone: 15 mg/dL  / Bili: Negative / Urobili: 0.2 mg/dL   Blood: x / Protein: Trace mg/dL / Nitrite: Negative   Leuk Esterase: Negative / RBC: x / WBC x   Sq Epi: x / Non Sq Epi: x / Bacteria: x    ACC: [id number] EXAM:  CT ABDOMEN AND PELVIS IC   ORDERED BY: GAYATHRI LACKEY     PROCEDURE DATE:  01/08/2024      INTERPRETATION:  CLINICAL INDICATION:  Abdominal pain, acute, nonlocalized    COMPARISON: MRI of the pelvis dated 4/13/2023    TECHNIQUE: CT imaging of the abdomen and pelvis was performed with IV   contrast.    CONTRAST/COMPLICATIONS:  IV Contrast: Omnipaque 350  90 cc administered  Oral Contrast: NONE  Complications: None reported at time of study completion    FINDINGS:    LOWER CHEST: Small hiatal hernia. Subsegmental atelectasis.    ABDOMEN:  LIVER: within normal limits.  BILE DUCTS: normal caliber.  GALLBLADDER: No calcified gallstones. Normal caliber wall.  PANCREAS: within normal limits.  SPLEEN: within normal limits.  ADRENALS: within normal limits.  KIDNEYS/URETERS: within normal limits.    PELVIS:  REPRODUCTIVE ORGANS: Bilateral ovarian nodules are better evaluated on   the ultrasound of the pelvis dated 9/5/2023 and MRI of the pelvis dated   4/13/2023.  BLADDER: within normal limits.    BOWEL: Status post partial sigmoid resection. Dilated loops of   fluid-filled small bowel with a transition to collapsed loops in the   pelvis consistent with a small bowel obstruction. Small volume interloop   edema.  PERITONEUM: Small volume mildly complex free fluid in the pelvis.  VESSELS: Normal caliber aorta.  RETROPERITONEUM: within normal limits.  ABDOMINAL WALL: within normal limits.  BONES: Degenerative changes.    IMPRESSION:    Small bowel obstruction with a transition to collapsed loops in the   pelvis. Small volume interloop edema and small volume free fluid fluid in   the pelvis.    Bilateral ovarian nodules are better evaluated on the ultrasound of the   pelvis dated 9/5/2023 and MRI of the pelvis dated 4/13/2023.    --- End of Report ---    CRISTAL KNOX MD; Attending Radiologist  This document has been electronically signed. Jan 8 2024  4:57AM

## 2024-01-08 NOTE — H&P ADULT - REASON FOR ADMISSION
74 yo female with with abdominal pain, nausea and vomiting [id number] 74 yo female with with abdominal pain, nausea and vomiting [id number]

## 2024-01-08 NOTE — ED ADULT NURSE REASSESSMENT NOTE - NS ED NURSE REASSESS COMMENT FT1
received report from BRYAN Mireles.  patient resting in stretcher in lowest locked position, VSS.  XR performed s/p NGT placement by night RN.  patient given gastrografin prior to XR small bowel series at approx 0920.  suction disconnected.  patient aware and agreeable with plan of care for admission to med surg.

## 2024-01-08 NOTE — H&P ADULT - NS ATTEND AMEND GEN_ALL_CORE FT
74 yo fem with h/o LAR by DR Cobos at [facility] 2012 due to sigmoid stricture, admitted with abd pain, emesis. CT showing SBO. Patient requested transfer to [facility]. Transfer refused since patient hasn't seen DR Cobos in 3years  -Will cont NGT  -Gastrografin SBS  -IVF 72 yo fem with h/o LAR by DR Cobos at [facility] 2012 due to sigmoid stricture, admitted with abd pain, emesis. CT showing SBO. Patient requested transfer to [facility]. Transfer refused since patient hasn't seen DR Cobos in 3years  -Will cont NGT  -Gastrografin SBS  -IVF 72 yo fem with h/o LAR by DR Cobos at [facility] 2021 due to sigmoid stricture, admitted with abd pain, emesis. CT showing SBO. Patient requested transfer to [facility]. Transfer refused since patient hasn't seen DR Cobos in 3years  -Will cont NGT  -Gastrografin SBS  -IVF 74 yo fem with h/o LAR by DR Cobos at [facility] 2021 due to sigmoid stricture, admitted with abd pain, emesis. CT showing SBO. Patient requested transfer to [facility]. Transfer refused since patient hasn't seen DR Cobos in 3years  -Will cont NGT  -Gastrografin SBS  -IVF

## 2024-01-09 ENCOUNTER — TRANSCRIPTION ENCOUNTER (OUTPATIENT)
Age: 74
End: 2024-01-09

## 2024-01-09 VITALS
OXYGEN SATURATION: 98 % | TEMPERATURE: 98 F | DIASTOLIC BLOOD PRESSURE: 50 MMHG | RESPIRATION RATE: 18 BRPM | HEART RATE: 76 BPM | SYSTOLIC BLOOD PRESSURE: 131 MMHG

## 2024-01-09 LAB
ANION GAP SERPL CALC-SCNC: 3 MMOL/L — LOW (ref 5–17)
ANION GAP SERPL CALC-SCNC: 3 MMOL/L — LOW (ref 5–17)
BUN SERPL-MCNC: 19 MG/DL — SIGNIFICANT CHANGE UP (ref 7–23)
BUN SERPL-MCNC: 19 MG/DL — SIGNIFICANT CHANGE UP (ref 7–23)
CALCIUM SERPL-MCNC: 8.9 MG/DL — SIGNIFICANT CHANGE UP (ref 8.5–10.1)
CALCIUM SERPL-MCNC: 8.9 MG/DL — SIGNIFICANT CHANGE UP (ref 8.5–10.1)
CHLORIDE SERPL-SCNC: 109 MMOL/L — HIGH (ref 96–108)
CHLORIDE SERPL-SCNC: 109 MMOL/L — HIGH (ref 96–108)
CO2 SERPL-SCNC: 27 MMOL/L — SIGNIFICANT CHANGE UP (ref 22–31)
CO2 SERPL-SCNC: 27 MMOL/L — SIGNIFICANT CHANGE UP (ref 22–31)
CREAT SERPL-MCNC: 0.74 MG/DL — SIGNIFICANT CHANGE UP (ref 0.5–1.3)
CREAT SERPL-MCNC: 0.74 MG/DL — SIGNIFICANT CHANGE UP (ref 0.5–1.3)
CULTURE RESULTS: SIGNIFICANT CHANGE UP
CULTURE RESULTS: SIGNIFICANT CHANGE UP
EGFR: 86 ML/MIN/1.73M2 — SIGNIFICANT CHANGE UP
EGFR: 86 ML/MIN/1.73M2 — SIGNIFICANT CHANGE UP
GLUCOSE SERPL-MCNC: 75 MG/DL — SIGNIFICANT CHANGE UP (ref 70–99)
GLUCOSE SERPL-MCNC: 75 MG/DL — SIGNIFICANT CHANGE UP (ref 70–99)
HCT VFR BLD CALC: 37.2 % — SIGNIFICANT CHANGE UP (ref 34.5–45)
HCT VFR BLD CALC: 37.2 % — SIGNIFICANT CHANGE UP (ref 34.5–45)
HGB BLD-MCNC: 12.2 G/DL — SIGNIFICANT CHANGE UP (ref 11.5–15.5)
HGB BLD-MCNC: 12.2 G/DL — SIGNIFICANT CHANGE UP (ref 11.5–15.5)
MCHC RBC-ENTMCNC: 29.2 PG — SIGNIFICANT CHANGE UP (ref 27–34)
MCHC RBC-ENTMCNC: 29.2 PG — SIGNIFICANT CHANGE UP (ref 27–34)
MCHC RBC-ENTMCNC: 32.8 GM/DL — SIGNIFICANT CHANGE UP (ref 32–36)
MCHC RBC-ENTMCNC: 32.8 GM/DL — SIGNIFICANT CHANGE UP (ref 32–36)
MCV RBC AUTO: 89 FL — SIGNIFICANT CHANGE UP (ref 80–100)
MCV RBC AUTO: 89 FL — SIGNIFICANT CHANGE UP (ref 80–100)
PLATELET # BLD AUTO: 316 K/UL — SIGNIFICANT CHANGE UP (ref 150–400)
PLATELET # BLD AUTO: 316 K/UL — SIGNIFICANT CHANGE UP (ref 150–400)
POTASSIUM SERPL-MCNC: 4.3 MMOL/L — SIGNIFICANT CHANGE UP (ref 3.5–5.3)
POTASSIUM SERPL-MCNC: 4.3 MMOL/L — SIGNIFICANT CHANGE UP (ref 3.5–5.3)
POTASSIUM SERPL-SCNC: 4.3 MMOL/L — SIGNIFICANT CHANGE UP (ref 3.5–5.3)
POTASSIUM SERPL-SCNC: 4.3 MMOL/L — SIGNIFICANT CHANGE UP (ref 3.5–5.3)
RBC # BLD: 4.18 M/UL — SIGNIFICANT CHANGE UP (ref 3.8–5.2)
RBC # BLD: 4.18 M/UL — SIGNIFICANT CHANGE UP (ref 3.8–5.2)
RBC # FLD: 12.8 % — SIGNIFICANT CHANGE UP (ref 10.3–14.5)
RBC # FLD: 12.8 % — SIGNIFICANT CHANGE UP (ref 10.3–14.5)
SODIUM SERPL-SCNC: 139 MMOL/L — SIGNIFICANT CHANGE UP (ref 135–145)
SODIUM SERPL-SCNC: 139 MMOL/L — SIGNIFICANT CHANGE UP (ref 135–145)
SPECIMEN SOURCE: SIGNIFICANT CHANGE UP
SPECIMEN SOURCE: SIGNIFICANT CHANGE UP
WBC # BLD: 7.89 K/UL — SIGNIFICANT CHANGE UP (ref 3.8–10.5)
WBC # BLD: 7.89 K/UL — SIGNIFICANT CHANGE UP (ref 3.8–10.5)
WBC # FLD AUTO: 7.89 K/UL — SIGNIFICANT CHANGE UP (ref 3.8–10.5)
WBC # FLD AUTO: 7.89 K/UL — SIGNIFICANT CHANGE UP (ref 3.8–10.5)

## 2024-01-09 PROCEDURE — 74019 RADEX ABDOMEN 2 VIEWS: CPT | Mod: 26

## 2024-01-09 RX ORDER — ACETAMINOPHEN 500 MG
650 TABLET ORAL ONCE
Refills: 0 | Status: COMPLETED | OUTPATIENT
Start: 2024-01-09 | End: 2024-01-09

## 2024-01-09 RX ORDER — BENZOCAINE AND MENTHOL 5; 1 G/100ML; G/100ML
1 LIQUID ORAL ONCE
Refills: 0 | Status: COMPLETED | OUTPATIENT
Start: 2024-01-09 | End: 2024-01-09

## 2024-01-09 RX ADMIN — Medication 650 MILLIGRAM(S): at 10:56

## 2024-01-09 RX ADMIN — Medication 650 MILLIGRAM(S): at 11:44

## 2024-01-09 RX ADMIN — BENZOCAINE AND MENTHOL 1 LOZENGE: 5; 1 LIQUID ORAL at 10:57

## 2024-01-09 RX ADMIN — PANTOPRAZOLE SODIUM 40 MILLIGRAM(S): 20 TABLET, DELAYED RELEASE ORAL at 10:20

## 2024-01-09 RX ADMIN — HEPARIN SODIUM 5000 UNIT(S): 5000 INJECTION INTRAVENOUS; SUBCUTANEOUS at 05:57

## 2024-01-09 NOTE — DISCHARGE NOTE PROVIDER - NSDCFUSCHEDAPPT_GEN_ALL_CORE_FT
Bere Lozano  [facility]  FAMILYMED [address]  Scheduled Appointment: 02/07/2024     Bere Lozano  [facility]  FAMILYMED [address]  Scheduled Appointment: 02/07/2024

## 2024-01-09 NOTE — DISCHARGE NOTE PROVIDER - NSDCMRMEDTOKEN_GEN_ALL_CORE_FT
Artificial Tears ophthalmic solution: 1 drop(s) in each eye 2 times a day as needed for  dry eyes  biotin 1000 mcg oral tablet: 1 tab(s) orally  cholecalciferol 25 mcg (1000 intl units) oral tablet: 1 tab(s) orally once a day  cyanocobalamin 1000 mcg oral tablet: 1 tab(s) orally once a day  hydroxychloroquine 200 mg oral tablet: 1 tab(s) orally 2 times a day  rosuvastatin 10 mg oral tablet: 1 tab(s) orally once a day  sertraline 25 mg oral tablet: 1 tab(s) orally once a day

## 2024-01-09 NOTE — DISCHARGE NOTE NURSING/CASE MANAGEMENT/SOCIAL WORK - PATIENT PORTAL LINK FT
You can access the FollowMyHealth Patient Portal offered by [facility] by registering at the following website: [email or web address]. By joining [facility]’s FollowMyHealth portal, you will also be able to view your health information using other applications (apps) compatible with our system. You can access the FollowMyHealth Patient Portal offered by [facility] by registering at the following website: [email or web address]. By joining [facility]’s FollowMyHealth portal, you will also be able to view your health information using other applications (apps) compatible with our system.

## 2024-01-09 NOTE — PROGRESS NOTE ADULT - SUBJECTIVE AND OBJECTIVE BOX
72 yo fem resolved SBO, normal SBS, having bowel movements    Abd soft, ND          01-09-24 @ 08:24    Vital Signs Last 24 Hrs  T(C): 36.5 (09 Jan 2024 07:40), Max: 37.2 (08 Jan 2024 16:30)  T(F): 97.7 (09 Jan 2024 07:40), Max: 99 (08 Jan 2024 16:30)  HR: 76 (09 Jan 2024 07:40) (69 - 78)  BP: 131/50 (09 Jan 2024 07:40) (111/64 - 131/50)  BP(mean): --  RR: 18 (09 Jan 2024 07:40) (16 - 18)  SpO2: 98% (09 Jan 2024 07:40) (94% - 100%)    Parameters below as of 09 Jan 2024 07:40  Patient On (Oxygen Delivery Method): room air                              14.6   13.35 )-----------( 420      ( 08 Jan 2024 03:52 )             42.9       01-08    136  |  104  |  21  ----------------------------<  132<H>  4.2   |  27  |  1.00    Ca    9.9      08 Jan 2024 03:52    TPro  7.5  /  Alb  4.0  /  TBili  0.5  /  DBili  x   /  AST  30  /  ALT  47  /  AlkPhos  79  01-08      LIVER FUNCTIONS - ( 08 Jan 2024 03:52 )  Alb: 4.0 g/dL / Pro: 7.5 gm/dL / ALK PHOS: 79 U/L / ALT: 47 U/L / AST: 30 U/L / GGT: x             MEDICATIONS  (STANDING):  heparin   Injectable 5000 Unit(s) SubCutaneous every 8 hours  pantoprazole  Injectable 40 milliGRAM(s) IV Push daily    MEDICATIONS  (PRN):  ondansetron Injectable 4 milliGRAM(s) IV Push every 6 hours PRN Nausea      MEDICATIONS  (STANDING):  heparin   Injectable 5000 Unit(s) SubCutaneous every 8 hours  pantoprazole  Injectable 40 milliGRAM(s) IV Push daily

## 2024-01-09 NOTE — PROGRESS NOTE ADULT - ASSESSMENT
72 yo fem with resolved SBO  -D/c NGT  -Full liquid diet  -soft diet for lunch, d/c home if tolerated 74 yo fem with resolved SBO  -D/c NGT  -Full liquid diet  -soft diet for lunch, d/c home if tolerated

## 2024-01-09 NOTE — PROGRESS NOTE ADULT - REASON FOR ADMISSION
72 yo female with with abdominal pain, nausea and vomiting [id number] 74 yo female with with abdominal pain, nausea and vomiting [id number]

## 2024-01-09 NOTE — DISCHARGE NOTE PROVIDER - CARE PROVIDER_API CALL
Lee Kee.  Surgery  [address] [place] [id number]  Phone: [phone]  Fax: [phone]  Follow Up Time:    Lee Kee.  Surgery  [address] [place] [id number]  Phone: [phone]  Fax: [phone]  Follow Up Time:

## 2024-01-09 NOTE — DISCHARGE NOTE PROVIDER - NSDCQMAMI_CARD_ALL_CORE
have not been needing this for blood pressure management.  If blood pressure starts to creep up, we could consider adding back possibly even a half dose of therapy.  If you have any questions or recurrent symptoms, please contact us.  We will follow along with any upcoming appointments with specialists.  
No

## 2024-01-09 NOTE — DISCHARGE NOTE NURSING/CASE MANAGEMENT/SOCIAL WORK - NSDCPEFALRISK_GEN_ALL_CORE
For information on Fall & Injury Prevention, visit: [email or web address] OR  [email or web address]-injury OR  https://www.cdc.gov/steadi/patient.html For information on Fall & Injury Prevention, visit: [email or web address]://[email or web address]/[email or web address]-mobility OR  [email or web address]://[email or web address]/[email or web address]-tips-to-avoid-injury OR  https://www.cdc.gov/steadi/patient.html

## 2024-01-09 NOTE — DISCHARGE NOTE PROVIDER - HOSPITAL COURSE
72 yo fem h/o LAR at [facility] by Dr Osman for sigmoid stricture on 2021, presented to Hospital with abd pain, emesis. CT Abd showed SBO. Small bowel series was performed with contrast reaching colon within hours. Patient tolerated diet. 72 yo fem h/o LAR at [facility] by Dr Osman for sigmoid stricture on 2021, presented to Hospital with abd pain, emesis. CT Abd showed SBO. Small bowel series was performed with contrast reaching colon within hours. Patient tolerated diet.

## 2024-01-09 NOTE — DISCHARGE NOTE PROVIDER - REASON FOR ADMISSION
74 yo female with with abdominal pain, nausea and vomiting [id number] 72 yo female with with abdominal pain, nausea and vomiting [id number]

## 2024-01-10 LAB
HCV AB S/CO SERPL IA: 0.09 S/CO — SIGNIFICANT CHANGE UP (ref 0–0.99)
HCV AB S/CO SERPL IA: 0.09 S/CO — SIGNIFICANT CHANGE UP (ref 0–0.99)
HCV AB SERPL-IMP: SIGNIFICANT CHANGE UP
HCV AB SERPL-IMP: SIGNIFICANT CHANGE UP

## 2024-01-12 ENCOUNTER — APPOINTMENT (OUTPATIENT)
Dept: COLORECTAL SURGERY | Facility: HOSPITAL | Age: 74
End: 2024-01-12

## 2024-01-12 ENCOUNTER — APPOINTMENT (OUTPATIENT)
Dept: COLORECTAL SURGERY | Facility: CLINIC | Age: 74
End: 2024-01-12
Payer: MEDICARE

## 2024-01-12 PROCEDURE — 99213 OFFICE O/P EST LOW 20 MIN: CPT

## 2024-01-23 DIAGNOSIS — E78.5 HYPERLIPIDEMIA, UNSPECIFIED: ICD-10-CM

## 2024-01-23 DIAGNOSIS — F41.9 ANXIETY DISORDER, UNSPECIFIED: ICD-10-CM

## 2024-01-23 DIAGNOSIS — K56.609 UNSPECIFIED INTESTINAL OBSTRUCTION, UNSPECIFIED AS TO PARTIAL VERSUS COMPLETE OBSTRUCTION: ICD-10-CM

## 2024-01-23 DIAGNOSIS — K21.9 GASTRO-ESOPHAGEAL REFLUX DISEASE WITHOUT ESOPHAGITIS: ICD-10-CM

## 2024-01-23 DIAGNOSIS — Z79.899 OTHER LONG TERM (CURRENT) DRUG THERAPY: ICD-10-CM

## 2024-01-29 ENCOUNTER — APPOINTMENT (OUTPATIENT)
Dept: FAMILY MEDICINE | Facility: CLINIC | Age: 74
End: 2024-01-29
Payer: MEDICARE

## 2024-01-29 VITALS
DIASTOLIC BLOOD PRESSURE: 68 MMHG | TEMPERATURE: 97.7 F | BODY MASS INDEX: 26.29 KG/M2 | HEIGHT: 64 IN | HEART RATE: 50 BPM | OXYGEN SATURATION: 98 % | SYSTOLIC BLOOD PRESSURE: 118 MMHG | WEIGHT: 154 LBS

## 2024-01-29 DIAGNOSIS — K56.600 PARTIAL INTESTINAL OBSTRUCTION, UNSPECIFIED AS TO CAUSE: ICD-10-CM

## 2024-01-29 DIAGNOSIS — E04.2 NONTOXIC MULTINODULAR GOITER: ICD-10-CM

## 2024-01-29 DIAGNOSIS — I73.00 RAYNAUD'S SYNDROME W/OUT GANGRENE: ICD-10-CM

## 2024-01-29 DIAGNOSIS — Z86.59 PERSONAL HISTORY OF OTHER MENTAL AND BEHAVIORAL DISORDERS: ICD-10-CM

## 2024-01-29 DIAGNOSIS — E78.00 PURE HYPERCHOLESTEROLEMIA, UNSPECIFIED: ICD-10-CM

## 2024-01-29 DIAGNOSIS — M35.00 SICCA SYNDROME, UNSPECIFIED: ICD-10-CM

## 2024-01-29 DIAGNOSIS — K56.699 OTHER INTESTINAL OBSTRUCTION UNSPECIFIED AS TO PARTIAL VERSUS COMPLETE OBSTRUCTION: ICD-10-CM

## 2024-01-29 DIAGNOSIS — M19.90 UNSPECIFIED OSTEOARTHRITIS, UNSPECIFIED SITE: ICD-10-CM

## 2024-01-29 PROCEDURE — 99214 OFFICE O/P EST MOD 30 MIN: CPT

## 2024-01-29 RX ORDER — CIPROFLOXACIN 3 MG/ML
0.3 SOLUTION OPHTHALMIC
Qty: 5 | Refills: 0 | Status: DISCONTINUED | COMMUNITY
Start: 2023-11-06

## 2024-01-29 RX ORDER — AMOXICILLIN 500 MG/1
500 TABLET, FILM COATED ORAL
Qty: 21 | Refills: 0 | Status: DISCONTINUED | COMMUNITY
Start: 2024-01-15

## 2024-01-29 RX ORDER — ERYTHROMYCIN 5 MG/G
5 OINTMENT OPHTHALMIC
Qty: 4 | Refills: 0 | Status: DISCONTINUED | COMMUNITY
Start: 2023-11-07

## 2024-01-29 NOTE — HISTORY OF PRESENT ILLNESS
[FreeTextEntry1] : CHINA WU is a 73 year old female here for a follow up visit. [de-identified] : Zonia has hypercholesterolemia, osteoarthritis (back/hip w/ R leg pain/neuropathy sxs), Sjogren's syndrome, osteopenia, multiple thyroid nodules (stable on annual thyroid US 8/2023), GERD, and h/o depression, ovarian cysts.   She is UTD on follow up and recommended testing with her specialists incl Dr. Kelly (card), Dr. Espinosa (gyn onc), Dr. Wolf (rheum), Dr. Cobos (CR surg).   UTD on card eval and testing with Dr. Kelly in 2019 and all was wnl in the end ( nuclear stress test that was wnl, carotid US showed mild plaque, coronary CT score was only 9).  Zonia is s/p recent hospitalization 1/8/24 to 1/9/24 at  for possible small bowel obstruction vs acute gastroenteritis. She had acute abdominal pain that worsened and was assoc with V and was severe enough for her to go to  ED. CT in ED showed proximal small bowel dilatation and distal decompression c/w small bowel obstruction. Initial WBC was 13.4K on 1/8/24 improved to 7.9 K on 1/9/24. She had NGT placed and was given IVF hydration. She was given some po contrast as well and this was able to pass and led to signif watery diarrhea. AXR 1/9/24 showed no evidence of SBO. She was d/c to home.   She saw Dr. Cobos 1/12/24 for HFU visit. He felt her presentation could have been a transient SBO vs gastroenteritis and recommended conservative mgmt. and monitoring as she was not having N/V and was passing flatus. He recommended low residue/low fiber diet for a few days and po fluids. If she had incr/new/persistent sxs he advised would do repeat CT abd/pelvis.   Abd pain is resolved; The crampy abd pains resolved within the week of seeing Dr. Cobos.  GI system seems back to usual normal function. No abd pain, F/V, no blood in stools, no D/C. She is back to her usual higher fiber diet . No further sxs   Also Notices some Raynaud's, getting worse as she gets older. Only in hands; one finger episodically turns white and cold an dthen returns to normal with rewarming. Related to cold and damp weather . No other sxs.

## 2024-01-29 NOTE — HEALTH RISK ASSESSMENT
[No falls in past year] : Patient reported no falls in the past year [0] : 2) Feeling down, depressed, or hopeless: Not at all (0) [PHQ-2 Negative - No further assessment needed] : PHQ-2 Negative - No further assessment needed [Never] : Never [KYP2Nxksc] : 0

## 2024-01-29 NOTE — REVIEW OF SYSTEMS
[Constipation] : constipation [Heartburn] : heartburn [Joint Pain] : joint pain [Joint Stiffness] : joint stiffness [Depression] : depression [Negative] : Heme/Lymph [Abdominal Pain] : no abdominal pain [Diarrhea] : diarrhea [Vomiting] : no vomiting [Melena] : no melena [Suicidal] : not suicidal [Insomnia] : no insomnia [Anxiety] : no anxiety [FreeTextEntry4] : +angular chelitis/dry mouth/sores in mouth at times, saw derm and is taking B vits, had biopsy showing Sjogren's, working to hydrate well  [FreeTextEntry7] : +GERD, well controlled on daily PPI med; recent SBO vs acute gastroenteritis episode causing abdominal pain, now resolved, see HPI [FreeTextEntry9] : OA related joint stiffness and pain that she manages with keeping physically active  [de-identified] : +raynaud's in fingers [de-identified] : h/o depression, feeling well on low dose sertraline and is considering weaning dose at this point as thinks may no longer be needed ; curr dose 25 mg daily and will do QOD for a few wks and then try stopping

## 2024-01-29 NOTE — PLAN
[FreeTextEntry1] : Continue all medications as prescribed. No indication to check labs today.   Revd HIE records, hosp d/c papers, Dr. Cobos's note, reconciled meds/PMH etc and answered questions. As abdominal pain is resolved at this point she can slowly increase fiber intake with goal of getting to and maintaining higher fiber intake and must hydrate very well also. If any recurrent sxs should f/u here or with Dr. Cobos or go to ED if severe sxs.   Reviewed Raynauds symptoms, pathophysiology, treatment options. Keep core and extremities warm (i.e. wear a hat and gloves/mittens), avoid significant or prolonged exposure to cold, drink warm liquids, regular exercise. Patient can consider use of calcium channel blocker med (e.g. nifedipine, amlodipine) if desired. Follow up with specialist (rheumatology) if symptoms are severe or worsening or if above measures not helping. Discussed clean eating (eg Mediterranean style plant based eating plan) and regular exercise/staying as physically active as possible. Include balance exercises and strength training and core strengthening exercises for bone health and to decrease risk for falls.  Reviewed importance of good self care (e.g. meditation, yoga, adequate rest, regular exercise, magnesium, clean eating, etc.). She is thinking of weaning sertraline and we revd weaning method and if incr/new sxs mood wise with weaning down/off she should resume prior dose where felt well   Follow up with specialists as recommended by them.  Follow up as scheduled for [facility]PA visit and repeat fasting labs in Summer 2024.  Face-to-face time spent with patient, over half in discussion of the above diagnoses and treatment plan: 30 minutes.

## 2024-01-29 NOTE — PHYSICAL EXAM
[No Acute Distress] : no acute distress [Well Nourished] : well nourished [Well Developed] : well developed [Well-Appearing] : well-appearing [Normal Sclera/Conjunctiva] : normal sclera/conjunctiva [EOMI] : extraocular movements intact [Normal Outer Ear/Nose] : the outer ears and nose were normal in appearance [Normal Oropharynx] : the oropharynx was normal [No JVD] : no jugular venous distention [Supple] : supple [No Lymphadenopathy] : no lymphadenopathy [Thyroid Normal, No Nodules] : the thyroid was normal and there were no nodules present [No Respiratory Distress] : no respiratory distress  [No Accessory Muscle Use] : no accessory muscle use [Clear to Auscultation] : lungs were clear to auscultation bilaterally [Normal Rate] : normal rate  [Regular Rhythm] : with a regular rhythm [Normal S1, S2] : normal S1 and S2 [No Murmur] : no murmur heard [Pedal Pulses Present] : the pedal pulses are present [No Edema] : there was no peripheral edema [No Extremity Clubbing/Cyanosis] : no extremity clubbing/cyanosis [Soft] : abdomen soft [Non Tender] : non-tender [Non-distended] : non-distended [No Masses] : no abdominal mass palpated [No HSM] : no HSM [Normal Bowel Sounds] : normal bowel sounds [Normal Posterior Cervical Nodes] : no posterior cervical lymphadenopathy [Normal Anterior Cervical Nodes] : no anterior cervical lymphadenopathy [No Joint Swelling] : no joint swelling [Grossly Normal Strength/Tone] : grossly normal strength/tone [No Rash] : no rash [Coordination Grossly Intact] : coordination grossly intact [No Focal Deficits] : no focal deficits [Normal Gait] : normal gait [Deep Tendon Reflexes (DTR)] : deep tendon reflexes were 2+ and symmetric [Normal Affect] : the affect was normal [Normal Insight/Judgement] : insight and judgment were intact [de-identified] : +angular chelitis (mild) noted on exam today  [de-identified] : No Raynaud's evident at this time but she is warm right now; 2+ B Rad pulses; skin B hands wnl

## 2024-01-29 NOTE — ASSESSMENT
[FreeTextEntry1] : CHINA WU is a 73 year old female here for hspital follow up visit, and follow up on medical issues as noted above.

## 2024-02-07 ENCOUNTER — APPOINTMENT (OUTPATIENT)
Dept: FAMILY MEDICINE | Facility: CLINIC | Age: 74
End: 2024-02-07

## 2024-02-14 RX ORDER — ROSUVASTATIN CALCIUM 10 MG/1
10 TABLET, FILM COATED ORAL DAILY
Qty: 90 | Refills: 3 | Status: ACTIVE | COMMUNITY
Start: 2022-11-01 | End: 1900-01-01

## 2024-05-08 ENCOUNTER — NON-APPOINTMENT (OUTPATIENT)
Age: 74
End: 2024-05-08

## 2024-05-08 ENCOUNTER — APPOINTMENT (OUTPATIENT)
Dept: OPHTHALMOLOGY | Facility: CLINIC | Age: 74
End: 2024-05-08
Payer: MEDICARE

## 2024-05-08 PROCEDURE — 92083 EXTENDED VISUAL FIELD XM: CPT

## 2024-05-08 PROCEDURE — 92014 COMPRE OPH EXAM EST PT 1/>: CPT

## 2024-06-03 ENCOUNTER — APPOINTMENT (OUTPATIENT)
Dept: OBGYN | Facility: CLINIC | Age: 74
End: 2024-06-03
Payer: MEDICARE

## 2024-06-03 VITALS
WEIGHT: 148 LBS | BODY MASS INDEX: 25.27 KG/M2 | HEIGHT: 64 IN | SYSTOLIC BLOOD PRESSURE: 120 MMHG | DIASTOLIC BLOOD PRESSURE: 80 MMHG

## 2024-06-03 DIAGNOSIS — Z12.39 ENCOUNTER FOR OTHER SCREENING FOR MALIGNANT NEOPLASM OF BREAST: ICD-10-CM

## 2024-06-03 DIAGNOSIS — Z12.4 ENCOUNTER FOR SCREENING FOR MALIGNANT NEOPLASM OF CERVIX: ICD-10-CM

## 2024-06-03 DIAGNOSIS — Z01.419 ENCOUNTER FOR GYNECOLOGICAL EXAMINATION (GENERAL) (ROUTINE) W/OUT ABNORMAL FINDINGS: ICD-10-CM

## 2024-06-03 DIAGNOSIS — N83.8 OTHER NONINFLAMMATORY DISORDERS OF OVARY, FALLOPIAN TUBE AND BROAD LIGAMENT: ICD-10-CM

## 2024-06-03 DIAGNOSIS — M85.80 OTHER SPECIFIED DISORDERS OF BONE DENSITY AND STRUCTURE, UNSPECIFIED SITE: ICD-10-CM

## 2024-06-03 PROCEDURE — G0101: CPT

## 2024-06-03 NOTE — PHYSICAL EXAM
[Appropriately responsive] : appropriately responsive [Alert] : alert [No Acute Distress] : no acute distress [Examination Of The Breasts] : a normal appearance [No Discharge] : no discharge [No Masses] : no breast masses were palpable [Labia Majora] : normal [Labia Minora] : normal [Cystocele] : a cystocele [Rectocele] : a rectocele [Normal] : normal [Uterine Adnexae] : normal

## 2024-06-03 NOTE — HISTORY OF PRESENT ILLNESS
[Patient reported bone density results were abnormal] : Patient reported bone density results were abnormal [TextBox_4] : Zonia is a 72 y/o  who presents today for an annual exam.  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 She last saw Dr. Swenson  and as per plan of care discussed at that time she will repeat a pelvic US in one year to ensure 2 year stability.  Her daughter is getting .   [BoneDensityDate] : 3/21/22 [Mammogramdate] : 3/23/23 [TextBox_37] : osteopenia

## 2024-06-03 NOTE — DISCUSSION/SUMMARY
[FreeTextEntry1] : 1) pap performed 2) rx for screening mammo issued 3) rx for DEXA issued 4) pt to f/u for colonoscopy next year 5) f/u for pelvic sono 9/2024 for continued surveillance of Aarti's cysts  Return to office in one year, sooner prn

## 2024-06-04 LAB — HPV HIGH+LOW RISK DNA PNL CVX: NOT DETECTED

## 2024-06-05 LAB — CYTOLOGY CVX/VAG DOC THIN PREP: ABNORMAL

## 2024-06-10 ENCOUNTER — APPOINTMENT (OUTPATIENT)
Dept: MAMMOGRAPHY | Facility: CLINIC | Age: 74
End: 2024-06-10
Payer: MEDICARE

## 2024-06-10 ENCOUNTER — RESULT REVIEW (OUTPATIENT)
Age: 74
End: 2024-06-10

## 2024-06-10 ENCOUNTER — APPOINTMENT (OUTPATIENT)
Dept: RADIOLOGY | Facility: CLINIC | Age: 74
End: 2024-06-10
Payer: MEDICARE

## 2024-06-10 ENCOUNTER — OUTPATIENT (OUTPATIENT)
Dept: OUTPATIENT SERVICES | Facility: HOSPITAL | Age: 74
LOS: 1 days | End: 2024-06-10
Payer: MEDICARE

## 2024-06-10 DIAGNOSIS — Z12.39 ENCOUNTER FOR OTHER SCREENING FOR MALIGNANT NEOPLASM OF BREAST: ICD-10-CM

## 2024-06-10 DIAGNOSIS — M85.80 OTHER SPECIFIED DISORDERS OF BONE DENSITY AND STRUCTURE, UNSPECIFIED SITE: ICD-10-CM

## 2024-06-10 DIAGNOSIS — Z98.890 OTHER SPECIFIED POSTPROCEDURAL STATES: Chronic | ICD-10-CM

## 2024-06-10 DIAGNOSIS — Z98.49 CATARACT EXTRACTION STATUS, UNSPECIFIED EYE: Chronic | ICD-10-CM

## 2024-06-10 PROCEDURE — 77063 BREAST TOMOSYNTHESIS BI: CPT | Mod: 26

## 2024-06-10 PROCEDURE — 77067 SCR MAMMO BI INCL CAD: CPT | Mod: 26

## 2024-06-10 PROCEDURE — 77063 BREAST TOMOSYNTHESIS BI: CPT

## 2024-06-10 PROCEDURE — 77085 DXA BONE DENSITY AXL VRT FX: CPT

## 2024-06-10 PROCEDURE — 77067 SCR MAMMO BI INCL CAD: CPT

## 2024-06-11 PROCEDURE — 77085 DXA BONE DENSITY AXL VRT FX: CPT | Mod: 26

## 2024-06-12 ENCOUNTER — NON-APPOINTMENT (OUTPATIENT)
Age: 74
End: 2024-06-12

## 2024-06-24 ENCOUNTER — APPOINTMENT (OUTPATIENT)
Dept: FAMILY MEDICINE | Facility: CLINIC | Age: 74
End: 2024-06-24
Payer: MEDICARE

## 2024-06-24 PROCEDURE — 36415 COLL VENOUS BLD VENIPUNCTURE: CPT

## 2024-06-25 LAB
ALBUMIN SERPL ELPH-MCNC: 4.3 G/DL
ALP BLD-CCNC: 79 U/L
ALT SERPL-CCNC: 44 U/L
ANION GAP SERPL CALC-SCNC: 12 MMOL/L
AST SERPL-CCNC: 36 U/L
BILIRUB SERPL-MCNC: 0.3 MG/DL
BUN SERPL-MCNC: 19 MG/DL
CALCIUM SERPL-MCNC: 9.7 MG/DL
CHLORIDE SERPL-SCNC: 102 MMOL/L
CHOLEST SERPL-MCNC: 184 MG/DL
CO2 SERPL-SCNC: 26 MMOL/L
CREAT SERPL-MCNC: 0.86 MG/DL
EGFR: 72 ML/MIN/1.73M2
GLUCOSE SERPL-MCNC: 91 MG/DL
HDLC SERPL-MCNC: 90 MG/DL
LDLC SERPL CALC-MCNC: 83 MG/DL
NONHDLC SERPL-MCNC: 94 MG/DL
POTASSIUM SERPL-SCNC: 4.7 MMOL/L
PROT SERPL-MCNC: 6.5 G/DL
SODIUM SERPL-SCNC: 140 MMOL/L
TRIGL SERPL-MCNC: 60 MG/DL
TSH SERPL-ACNC: 2.36 UIU/ML

## 2024-07-05 ENCOUNTER — APPOINTMENT (OUTPATIENT)
Dept: FAMILY MEDICINE | Facility: CLINIC | Age: 74
End: 2024-07-05
Payer: MEDICARE

## 2024-07-05 VITALS
OXYGEN SATURATION: 95 % | HEIGHT: 64 IN | DIASTOLIC BLOOD PRESSURE: 68 MMHG | BODY MASS INDEX: 25.1 KG/M2 | WEIGHT: 147 LBS | SYSTOLIC BLOOD PRESSURE: 108 MMHG | HEART RATE: 81 BPM | TEMPERATURE: 97.6 F

## 2024-07-05 DIAGNOSIS — M35.00 SICCA SYNDROME, UNSPECIFIED: ICD-10-CM

## 2024-07-05 DIAGNOSIS — Z80.41 FAMILY HISTORY OF MALIGNANT NEOPLASM OF OVARY: ICD-10-CM

## 2024-07-05 DIAGNOSIS — K21.9 GASTRO-ESOPHAGEAL REFLUX DISEASE W/OUT ESOPHAGITIS: ICD-10-CM

## 2024-07-05 DIAGNOSIS — E04.2 NONTOXIC MULTINODULAR GOITER: ICD-10-CM

## 2024-07-05 DIAGNOSIS — Z86.59 PERSONAL HISTORY OF OTHER MENTAL AND BEHAVIORAL DISORDERS: ICD-10-CM

## 2024-07-05 DIAGNOSIS — M85.80 OTHER SPECIFIED DISORDERS OF BONE DENSITY AND STRUCTURE, UNSPECIFIED SITE: ICD-10-CM

## 2024-07-05 DIAGNOSIS — E78.00 PURE HYPERCHOLESTEROLEMIA, UNSPECIFIED: ICD-10-CM

## 2024-07-05 DIAGNOSIS — M19.90 UNSPECIFIED OSTEOARTHRITIS, UNSPECIFIED SITE: ICD-10-CM

## 2024-07-05 PROCEDURE — G2211 COMPLEX E/M VISIT ADD ON: CPT

## 2024-07-05 PROCEDURE — 99214 OFFICE O/P EST MOD 30 MIN: CPT

## 2024-07-05 RX ORDER — HYDROXYCHLOROQUINE SULFATE 200 MG/1
200 TABLET ORAL DAILY
Refills: 0 | Status: ACTIVE | COMMUNITY

## 2024-09-25 ENCOUNTER — APPOINTMENT (OUTPATIENT)
Dept: ULTRASOUND IMAGING | Facility: CLINIC | Age: 74
End: 2024-09-25
Payer: MEDICARE

## 2024-09-25 ENCOUNTER — OUTPATIENT (OUTPATIENT)
Dept: OUTPATIENT SERVICES | Facility: HOSPITAL | Age: 74
LOS: 1 days | End: 2024-09-25
Payer: MEDICARE

## 2024-09-25 DIAGNOSIS — Z98.49 CATARACT EXTRACTION STATUS, UNSPECIFIED EYE: Chronic | ICD-10-CM

## 2024-09-25 DIAGNOSIS — E04.2 NONTOXIC MULTINODULAR GOITER: ICD-10-CM

## 2024-09-25 DIAGNOSIS — Z98.890 OTHER SPECIFIED POSTPROCEDURAL STATES: Chronic | ICD-10-CM

## 2024-09-25 DIAGNOSIS — N83.8 OTHER NONINFLAMMATORY DISORDERS OF OVARY, FALLOPIAN TUBE AND BROAD LIGAMENT: ICD-10-CM

## 2024-09-25 PROCEDURE — 76856 US EXAM PELVIC COMPLETE: CPT

## 2024-09-25 PROCEDURE — 76536 US EXAM OF HEAD AND NECK: CPT | Mod: 26

## 2024-09-25 PROCEDURE — 76856 US EXAM PELVIC COMPLETE: CPT | Mod: 26

## 2024-09-25 PROCEDURE — 76830 TRANSVAGINAL US NON-OB: CPT | Mod: 26

## 2024-09-25 PROCEDURE — 76536 US EXAM OF HEAD AND NECK: CPT

## 2024-09-25 PROCEDURE — 76830 TRANSVAGINAL US NON-OB: CPT

## 2024-09-28 DIAGNOSIS — N83.291 OTHER OVARIAN CYST, RIGHT SIDE: ICD-10-CM

## 2024-11-11 ENCOUNTER — APPOINTMENT (OUTPATIENT)
Dept: FAMILY MEDICINE | Facility: CLINIC | Age: 74
End: 2024-11-11
Payer: MEDICARE

## 2024-11-11 PROCEDURE — 36415 COLL VENOUS BLD VENIPUNCTURE: CPT

## 2024-11-19 ENCOUNTER — NON-APPOINTMENT (OUTPATIENT)
Age: 74
End: 2024-11-19

## 2024-11-19 ENCOUNTER — APPOINTMENT (OUTPATIENT)
Dept: FAMILY MEDICINE | Facility: CLINIC | Age: 74
End: 2024-11-19
Payer: MEDICARE

## 2024-11-19 VITALS
WEIGHT: 147 LBS | SYSTOLIC BLOOD PRESSURE: 108 MMHG | DIASTOLIC BLOOD PRESSURE: 64 MMHG | OXYGEN SATURATION: 97 % | BODY MASS INDEX: 25.1 KG/M2 | HEART RATE: 86 BPM | TEMPERATURE: 97.3 F | HEIGHT: 64 IN

## 2024-11-19 DIAGNOSIS — E78.00 PURE HYPERCHOLESTEROLEMIA, UNSPECIFIED: ICD-10-CM

## 2024-11-19 DIAGNOSIS — M85.80 OTHER SPECIFIED DISORDERS OF BONE DENSITY AND STRUCTURE, UNSPECIFIED SITE: ICD-10-CM

## 2024-11-19 DIAGNOSIS — R60.0 LOCALIZED EDEMA: ICD-10-CM

## 2024-11-19 DIAGNOSIS — M19.90 UNSPECIFIED OSTEOARTHRITIS, UNSPECIFIED SITE: ICD-10-CM

## 2024-11-19 DIAGNOSIS — Z00.00 ENCOUNTER FOR GENERAL ADULT MEDICAL EXAMINATION W/OUT ABNORMAL FINDINGS: ICD-10-CM

## 2024-11-19 DIAGNOSIS — E04.2 NONTOXIC MULTINODULAR GOITER: ICD-10-CM

## 2024-11-19 DIAGNOSIS — Z86.59 PERSONAL HISTORY OF OTHER MENTAL AND BEHAVIORAL DISORDERS: ICD-10-CM

## 2024-11-19 DIAGNOSIS — M35.00 SICCA SYNDROME, UNSPECIFIED: ICD-10-CM

## 2024-11-19 PROCEDURE — G0403: CPT

## 2024-11-19 PROCEDURE — G0402 INITIAL PREVENTIVE EXAM: CPT

## 2024-11-19 PROCEDURE — G0439: CPT

## 2024-11-19 PROCEDURE — G0008: CPT

## 2024-11-19 PROCEDURE — 36415 COLL VENOUS BLD VENIPUNCTURE: CPT

## 2024-11-19 PROCEDURE — 93000 ELECTROCARDIOGRAM COMPLETE: CPT

## 2024-11-19 PROCEDURE — 90656 IIV3 VACC NO PRSV 0.5 ML IM: CPT

## 2025-01-13 ENCOUNTER — APPOINTMENT (OUTPATIENT)
Dept: OPHTHALMOLOGY | Facility: CLINIC | Age: 75
End: 2025-01-13

## 2025-02-20 ENCOUNTER — OUTPATIENT (OUTPATIENT)
Dept: OUTPATIENT SERVICES | Facility: HOSPITAL | Age: 75
LOS: 1 days | End: 2025-02-20
Payer: MEDICARE

## 2025-02-20 ENCOUNTER — APPOINTMENT (OUTPATIENT)
Dept: ULTRASOUND IMAGING | Facility: CLINIC | Age: 75
End: 2025-02-20
Payer: MEDICARE

## 2025-02-20 DIAGNOSIS — N83.291 OTHER OVARIAN CYST, RIGHT SIDE: ICD-10-CM

## 2025-02-20 DIAGNOSIS — Z98.890 OTHER SPECIFIED POSTPROCEDURAL STATES: Chronic | ICD-10-CM

## 2025-02-20 DIAGNOSIS — Z98.49 CATARACT EXTRACTION STATUS, UNSPECIFIED EYE: Chronic | ICD-10-CM

## 2025-02-20 PROCEDURE — 76830 TRANSVAGINAL US NON-OB: CPT

## 2025-02-20 PROCEDURE — 76830 TRANSVAGINAL US NON-OB: CPT | Mod: 26

## 2025-03-10 ENCOUNTER — APPOINTMENT (OUTPATIENT)
Dept: OPHTHALMOLOGY | Facility: CLINIC | Age: 75
End: 2025-03-10
Payer: MEDICARE

## 2025-03-10 ENCOUNTER — NON-APPOINTMENT (OUTPATIENT)
Age: 75
End: 2025-03-10

## 2025-03-10 PROCEDURE — 92014 COMPRE OPH EXAM EST PT 1/>: CPT

## 2025-03-10 PROCEDURE — 92134 CPTRZ OPH DX IMG PST SGM RTA: CPT

## 2025-03-10 PROCEDURE — 92283 EXTND COLOR VISION XM: CPT

## 2025-07-07 ENCOUNTER — RESULT REVIEW (OUTPATIENT)
Age: 75
End: 2025-07-07

## 2025-07-07 ENCOUNTER — APPOINTMENT (OUTPATIENT)
Dept: ULTRASOUND IMAGING | Facility: CLINIC | Age: 75
End: 2025-07-07
Payer: MEDICARE

## 2025-07-07 ENCOUNTER — APPOINTMENT (OUTPATIENT)
Dept: MAMMOGRAPHY | Facility: CLINIC | Age: 75
End: 2025-07-07
Payer: MEDICARE

## 2025-07-07 ENCOUNTER — OUTPATIENT (OUTPATIENT)
Dept: OUTPATIENT SERVICES | Facility: HOSPITAL | Age: 75
LOS: 1 days | End: 2025-07-07
Payer: MEDICARE

## 2025-07-07 DIAGNOSIS — Z12.39 ENCOUNTER FOR OTHER SCREENING FOR MALIGNANT NEOPLASM OF BREAST: ICD-10-CM

## 2025-07-07 DIAGNOSIS — Z98.890 OTHER SPECIFIED POSTPROCEDURAL STATES: Chronic | ICD-10-CM

## 2025-07-07 DIAGNOSIS — Z98.49 CATARACT EXTRACTION STATUS, UNSPECIFIED EYE: Chronic | ICD-10-CM

## 2025-07-07 PROCEDURE — 77063 BREAST TOMOSYNTHESIS BI: CPT | Mod: 26

## 2025-07-07 PROCEDURE — 77067 SCR MAMMO BI INCL CAD: CPT | Mod: 26

## 2025-07-07 PROCEDURE — 77067 SCR MAMMO BI INCL CAD: CPT

## 2025-07-07 PROCEDURE — 77063 BREAST TOMOSYNTHESIS BI: CPT

## 2025-07-08 ENCOUNTER — TRANSCRIPTION ENCOUNTER (OUTPATIENT)
Age: 75
End: 2025-07-08

## 2025-07-21 ENCOUNTER — APPOINTMENT (OUTPATIENT)
Dept: FAMILY MEDICINE | Facility: CLINIC | Age: 75
End: 2025-07-21

## 2025-07-22 ENCOUNTER — LABORATORY RESULT (OUTPATIENT)
Age: 75
End: 2025-07-22

## 2025-07-27 PROBLEM — R79.89 ELEVATED LFTS: Status: ACTIVE | Noted: 2025-07-26

## 2025-07-28 ENCOUNTER — APPOINTMENT (OUTPATIENT)
Dept: FAMILY MEDICINE | Facility: CLINIC | Age: 75
End: 2025-07-28
Payer: MEDICARE

## 2025-07-28 VITALS
TEMPERATURE: 97.4 F | DIASTOLIC BLOOD PRESSURE: 64 MMHG | BODY MASS INDEX: 25.61 KG/M2 | HEIGHT: 64 IN | OXYGEN SATURATION: 97 % | WEIGHT: 150 LBS | HEART RATE: 79 BPM | SYSTOLIC BLOOD PRESSURE: 110 MMHG

## 2025-07-28 DIAGNOSIS — M19.90 UNSPECIFIED OSTEOARTHRITIS, UNSPECIFIED SITE: ICD-10-CM

## 2025-07-28 DIAGNOSIS — Z86.59 PERSONAL HISTORY OF OTHER MENTAL AND BEHAVIORAL DISORDERS: ICD-10-CM

## 2025-07-28 DIAGNOSIS — K21.9 GASTRO-ESOPHAGEAL REFLUX DISEASE W/OUT ESOPHAGITIS: ICD-10-CM

## 2025-07-28 DIAGNOSIS — M85.80 OTHER SPECIFIED DISORDERS OF BONE DENSITY AND STRUCTURE, UNSPECIFIED SITE: ICD-10-CM

## 2025-07-28 DIAGNOSIS — R79.89 OTHER SPECIFIED ABNORMAL FINDINGS OF BLOOD CHEMISTRY: ICD-10-CM

## 2025-07-28 DIAGNOSIS — E78.00 PURE HYPERCHOLESTEROLEMIA, UNSPECIFIED: ICD-10-CM

## 2025-07-28 DIAGNOSIS — E04.2 NONTOXIC MULTINODULAR GOITER: ICD-10-CM

## 2025-07-28 DIAGNOSIS — M35.00 SICCA SYNDROME, UNSPECIFIED: ICD-10-CM

## 2025-07-28 DIAGNOSIS — K59.09 OTHER CONSTIPATION: ICD-10-CM

## 2025-07-28 PROCEDURE — G2211 COMPLEX E/M VISIT ADD ON: CPT

## 2025-07-28 PROCEDURE — 99215 OFFICE O/P EST HI 40 MIN: CPT

## 2025-08-18 ENCOUNTER — TRANSCRIPTION ENCOUNTER (OUTPATIENT)
Age: 75
End: 2025-08-18

## 2025-08-22 ENCOUNTER — TRANSCRIPTION ENCOUNTER (OUTPATIENT)
Age: 75
End: 2025-08-22